# Patient Record
Sex: MALE | Race: WHITE | NOT HISPANIC OR LATINO | Employment: OTHER | URBAN - METROPOLITAN AREA
[De-identification: names, ages, dates, MRNs, and addresses within clinical notes are randomized per-mention and may not be internally consistent; named-entity substitution may affect disease eponyms.]

---

## 2024-09-28 ENCOUNTER — APPOINTMENT (OUTPATIENT)
Dept: RADIOLOGY | Facility: MEDICAL CENTER | Age: 49
DRG: 310 | End: 2024-09-28
Attending: EMERGENCY MEDICINE
Payer: COMMERCIAL

## 2024-09-28 ENCOUNTER — OFFICE VISIT (OUTPATIENT)
Dept: URGENT CARE | Facility: PHYSICIAN GROUP | Age: 49
End: 2024-09-28
Payer: COMMERCIAL

## 2024-09-28 ENCOUNTER — HOSPITAL ENCOUNTER (INPATIENT)
Facility: MEDICAL CENTER | Age: 49
LOS: 2 days | End: 2024-09-30
Attending: EMERGENCY MEDICINE | Admitting: INTERNAL MEDICINE
Payer: COMMERCIAL

## 2024-09-28 VITALS
WEIGHT: 304.1 LBS | BODY MASS INDEX: 37.81 KG/M2 | DIASTOLIC BLOOD PRESSURE: 94 MMHG | HEIGHT: 75 IN | TEMPERATURE: 97.4 F | HEART RATE: 98 BPM | SYSTOLIC BLOOD PRESSURE: 140 MMHG | RESPIRATION RATE: 16 BRPM | OXYGEN SATURATION: 98 %

## 2024-09-28 DIAGNOSIS — R79.89 ELEVATED BRAIN NATRIURETIC PEPTIDE (BNP) LEVEL: ICD-10-CM

## 2024-09-28 DIAGNOSIS — I48.91 ATRIAL FIBRILLATION, UNSPECIFIED TYPE (HCC): ICD-10-CM

## 2024-09-28 DIAGNOSIS — I48.91 ATRIAL FIBRILLATION WITH RVR (HCC): ICD-10-CM

## 2024-09-28 DIAGNOSIS — R00.2 PALPITATIONS: ICD-10-CM

## 2024-09-28 DIAGNOSIS — I48.0 PAROXYSMAL ATRIAL FIBRILLATION (HCC): ICD-10-CM

## 2024-09-28 PROBLEM — E66.9 OBESITY: Status: ACTIVE | Noted: 2024-09-28

## 2024-09-28 PROBLEM — I10 PRIMARY HYPERTENSION: Status: ACTIVE | Noted: 2024-09-28

## 2024-09-28 LAB
ALBUMIN SERPL BCP-MCNC: 4.4 G/DL (ref 3.2–4.9)
ALBUMIN/GLOB SERPL: 1.8 G/DL
ALP SERPL-CCNC: 48 U/L (ref 30–99)
ALT SERPL-CCNC: 27 U/L (ref 2–50)
ANION GAP SERPL CALC-SCNC: 9 MMOL/L (ref 7–16)
AST SERPL-CCNC: 20 U/L (ref 12–45)
BASOPHILS # BLD AUTO: 0.5 % (ref 0–1.8)
BASOPHILS # BLD: 0.03 K/UL (ref 0–0.12)
BILIRUB SERPL-MCNC: 0.5 MG/DL (ref 0.1–1.5)
BUN SERPL-MCNC: 19 MG/DL (ref 8–22)
CALCIUM ALBUM COR SERPL-MCNC: 8.9 MG/DL (ref 8.5–10.5)
CALCIUM SERPL-MCNC: 9.2 MG/DL (ref 8.5–10.5)
CHLORIDE SERPL-SCNC: 107 MMOL/L (ref 96–112)
CO2 SERPL-SCNC: 22 MMOL/L (ref 20–33)
CREAT SERPL-MCNC: 1.11 MG/DL (ref 0.5–1.4)
EKG IMPRESSION: NORMAL
EOSINOPHIL # BLD AUTO: 0.08 K/UL (ref 0–0.51)
EOSINOPHIL NFR BLD: 1.4 % (ref 0–6.9)
ERYTHROCYTE [DISTWIDTH] IN BLOOD BY AUTOMATED COUNT: 42.5 FL (ref 35.9–50)
EST. AVERAGE GLUCOSE BLD GHB EST-MCNC: 111 MG/DL
GFR SERPLBLD CREATININE-BSD FMLA CKD-EPI: 80 ML/MIN/1.73 M 2
GLOBULIN SER CALC-MCNC: 2.5 G/DL (ref 1.9–3.5)
GLUCOSE SERPL-MCNC: 108 MG/DL (ref 65–99)
HBA1C MFR BLD: 5.5 % (ref 4–5.6)
HCT VFR BLD AUTO: 45.6 % (ref 42–52)
HGB BLD-MCNC: 15.5 G/DL (ref 14–18)
IMM GRANULOCYTES # BLD AUTO: 0.02 K/UL (ref 0–0.11)
IMM GRANULOCYTES NFR BLD AUTO: 0.3 % (ref 0–0.9)
LYMPHOCYTES # BLD AUTO: 2.18 K/UL (ref 1–4.8)
LYMPHOCYTES NFR BLD: 38.1 % (ref 22–41)
MCH RBC QN AUTO: 32.8 PG (ref 27–33)
MCHC RBC AUTO-ENTMCNC: 34 G/DL (ref 32.3–36.5)
MCV RBC AUTO: 96.6 FL (ref 81.4–97.8)
MONOCYTES # BLD AUTO: 0.55 K/UL (ref 0–0.85)
MONOCYTES NFR BLD AUTO: 9.6 % (ref 0–13.4)
NEUTROPHILS # BLD AUTO: 2.86 K/UL (ref 1.82–7.42)
NEUTROPHILS NFR BLD: 50.1 % (ref 44–72)
NRBC # BLD AUTO: 0 K/UL
NRBC BLD-RTO: 0 /100 WBC (ref 0–0.2)
NT-PROBNP SERPL IA-MCNC: 433 PG/ML (ref 0–125)
PLATELET # BLD AUTO: 187 K/UL (ref 164–446)
PMV BLD AUTO: 9.7 FL (ref 9–12.9)
POTASSIUM SERPL-SCNC: 4.2 MMOL/L (ref 3.6–5.5)
PROT SERPL-MCNC: 6.9 G/DL (ref 6–8.2)
RBC # BLD AUTO: 4.72 M/UL (ref 4.7–6.1)
SODIUM SERPL-SCNC: 138 MMOL/L (ref 135–145)
TROPONIN T SERPL-MCNC: <6 NG/L (ref 6–19)
TROPONIN T SERPL-MCNC: <6 NG/L (ref 6–19)
TSH SERPL DL<=0.005 MIU/L-ACNC: 1.32 UIU/ML (ref 0.38–5.33)
WBC # BLD AUTO: 5.7 K/UL (ref 4.8–10.8)

## 2024-09-28 PROCEDURE — 99222 1ST HOSP IP/OBS MODERATE 55: CPT | Performed by: INTERNAL MEDICINE

## 2024-09-28 PROCEDURE — 80053 COMPREHEN METABOLIC PANEL: CPT

## 2024-09-28 PROCEDURE — 96374 THER/PROPH/DIAG INJ IV PUSH: CPT

## 2024-09-28 PROCEDURE — 700102 HCHG RX REV CODE 250 W/ 637 OVERRIDE(OP): Performed by: INTERNAL MEDICINE

## 2024-09-28 PROCEDURE — 84484 ASSAY OF TROPONIN QUANT: CPT

## 2024-09-28 PROCEDURE — 99285 EMERGENCY DEPT VISIT HI MDM: CPT

## 2024-09-28 PROCEDURE — 93005 ELECTROCARDIOGRAM TRACING: CPT | Performed by: EMERGENCY MEDICINE

## 2024-09-28 PROCEDURE — RXMED WILLOW AMBULATORY MEDICATION CHARGE: Performed by: EMERGENCY MEDICINE

## 2024-09-28 PROCEDURE — 93005 ELECTROCARDIOGRAM TRACING: CPT

## 2024-09-28 PROCEDURE — A9270 NON-COVERED ITEM OR SERVICE: HCPCS | Performed by: INTERNAL MEDICINE

## 2024-09-28 PROCEDURE — 770020 HCHG ROOM/CARE - TELE (206)

## 2024-09-28 PROCEDURE — 83036 HEMOGLOBIN GLYCOSYLATED A1C: CPT

## 2024-09-28 PROCEDURE — 99223 1ST HOSP IP/OBS HIGH 75: CPT | Performed by: INTERNAL MEDICINE

## 2024-09-28 PROCEDURE — A9270 NON-COVERED ITEM OR SERVICE: HCPCS | Performed by: EMERGENCY MEDICINE

## 2024-09-28 PROCEDURE — 71045 X-RAY EXAM CHEST 1 VIEW: CPT

## 2024-09-28 PROCEDURE — 84443 ASSAY THYROID STIM HORMONE: CPT

## 2024-09-28 PROCEDURE — 36415 COLL VENOUS BLD VENIPUNCTURE: CPT

## 2024-09-28 PROCEDURE — 85025 COMPLETE CBC W/AUTO DIFF WBC: CPT

## 2024-09-28 PROCEDURE — 83880 ASSAY OF NATRIURETIC PEPTIDE: CPT

## 2024-09-28 PROCEDURE — 700102 HCHG RX REV CODE 250 W/ 637 OVERRIDE(OP): Performed by: EMERGENCY MEDICINE

## 2024-09-28 PROCEDURE — 700111 HCHG RX REV CODE 636 W/ 250 OVERRIDE (IP): Performed by: EMERGENCY MEDICINE

## 2024-09-28 RX ORDER — AMLODIPINE BESYLATE 5 MG/1
1 TABLET ORAL DAILY
Status: ON HOLD | COMMUNITY
Start: 2023-11-01 | End: 2024-09-30

## 2024-09-28 RX ORDER — METOPROLOL SUCCINATE 50 MG/1
50 TABLET, EXTENDED RELEASE ORAL ONCE
Status: COMPLETED | OUTPATIENT
Start: 2024-09-28 | End: 2024-09-28

## 2024-09-28 RX ORDER — METOPROLOL TARTRATE 1 MG/ML
5 INJECTION, SOLUTION INTRAVENOUS
Status: DISCONTINUED | OUTPATIENT
Start: 2024-09-28 | End: 2024-09-30 | Stop reason: HOSPADM

## 2024-09-28 RX ORDER — LABETALOL HYDROCHLORIDE 5 MG/ML
20 INJECTION, SOLUTION INTRAVENOUS ONCE
Status: COMPLETED | OUTPATIENT
Start: 2024-09-28 | End: 2024-09-28

## 2024-09-28 RX ORDER — SILDENAFIL CITRATE 20 MG/1
20 TABLET ORAL
COMMUNITY

## 2024-09-28 RX ORDER — METOPROLOL TARTRATE 50 MG
50 TABLET ORAL 2 TIMES DAILY
Status: DISCONTINUED | OUTPATIENT
Start: 2024-09-28 | End: 2024-09-30 | Stop reason: HOSPADM

## 2024-09-28 RX ORDER — METOPROLOL SUCCINATE 25 MG/1
25 TABLET, EXTENDED RELEASE ORAL
Status: DISCONTINUED | OUTPATIENT
Start: 2024-09-28 | End: 2024-09-28

## 2024-09-28 RX ORDER — ACETAMINOPHEN 325 MG/1
650 TABLET ORAL EVERY 6 HOURS PRN
Status: DISCONTINUED | OUTPATIENT
Start: 2024-09-28 | End: 2024-09-30 | Stop reason: HOSPADM

## 2024-09-28 RX ORDER — METOPROLOL SUCCINATE 50 MG/1
50 TABLET, EXTENDED RELEASE ORAL
Status: DISCONTINUED | OUTPATIENT
Start: 2024-09-28 | End: 2024-09-28

## 2024-09-28 RX ORDER — LISINOPRIL AND HYDROCHLOROTHIAZIDE 20; 25 MG/1; MG/1
1 TABLET ORAL DAILY
COMMUNITY
Start: 2023-11-01 | End: 2024-09-28

## 2024-09-28 RX ORDER — LISINOPRIL 20 MG/1
20 TABLET ORAL DAILY
COMMUNITY

## 2024-09-28 RX ADMIN — LABETALOL HYDROCHLORIDE 20 MG: 5 INJECTION INTRAVENOUS at 12:54

## 2024-09-28 RX ADMIN — RIVAROXABAN 20 MG: 20 TABLET, FILM COATED ORAL at 17:34

## 2024-09-28 RX ADMIN — METOPROLOL SUCCINATE 50 MG: 50 TABLET, EXTENDED RELEASE ORAL at 13:24

## 2024-09-28 RX ADMIN — METOPROLOL TARTRATE 50 MG: 50 TABLET, FILM COATED ORAL at 18:35

## 2024-09-28 SDOH — ECONOMIC STABILITY: TRANSPORTATION INSECURITY
IN THE PAST 12 MONTHS, HAS THE LACK OF TRANSPORTATION KEPT YOU FROM MEDICAL APPOINTMENTS OR FROM GETTING MEDICATIONS?: NO

## 2024-09-28 SDOH — ECONOMIC STABILITY: TRANSPORTATION INSECURITY
IN THE PAST 12 MONTHS, HAS LACK OF RELIABLE TRANSPORTATION KEPT YOU FROM MEDICAL APPOINTMENTS, MEETINGS, WORK OR FROM GETTING THINGS NEEDED FOR DAILY LIVING?: NO

## 2024-09-28 ASSESSMENT — ENCOUNTER SYMPTOMS
ORTHOPNEA: 0
COUGH: 0
VOMITING: 0
DIARRHEA: 0
PALPITATIONS: 1
BLURRED VISION: 0
FEVER: 0
MYALGIAS: 0
NAUSEA: 0
CLAUDICATION: 0
CONSTIPATION: 0
PALPITATIONS: 1
WEIGHT LOSS: 0
NAUSEA: 0
ABDOMINAL PAIN: 0
CHILLS: 0
SHORTNESS OF BREATH: 1
SORE THROAT: 0
FEVER: 0
VOMITING: 0
HEMOPTYSIS: 0
MYALGIAS: 0
WEAKNESS: 0
NECK PAIN: 0
CHEST PRESSURE: 0
DIZZINESS: 0
SPEECH CHANGE: 0
DIZZINESS: 0
DOUBLE VISION: 0
CHILLS: 0
PHOTOPHOBIA: 0
COUGH: 0

## 2024-09-28 ASSESSMENT — PATIENT HEALTH QUESTIONNAIRE - PHQ9
1. LITTLE INTEREST OR PLEASURE IN DOING THINGS: NOT AT ALL
SUM OF ALL RESPONSES TO PHQ9 QUESTIONS 1 AND 2: 0
2. FEELING DOWN, DEPRESSED, IRRITABLE, OR HOPELESS: NOT AT ALL
2. FEELING DOWN, DEPRESSED, IRRITABLE, OR HOPELESS: NOT AT ALL
1. LITTLE INTEREST OR PLEASURE IN DOING THINGS: NOT AT ALL
SUM OF ALL RESPONSES TO PHQ9 QUESTIONS 1 AND 2: 0

## 2024-09-28 ASSESSMENT — COGNITIVE AND FUNCTIONAL STATUS - GENERAL
MOBILITY SCORE: 24
SUGGESTED CMS G CODE MODIFIER DAILY ACTIVITY: CH
SUGGESTED CMS G CODE MODIFIER MOBILITY: CH
DAILY ACTIVITIY SCORE: 24

## 2024-09-28 ASSESSMENT — LIFESTYLE VARIABLES
DOES PATIENT WANT TO STOP DRINKING: NO
ALCOHOL_USE: YES
TOTAL SCORE: 0
ON A TYPICAL DAY WHEN YOU DRINK ALCOHOL HOW MANY DRINKS DO YOU HAVE: 3
TOTAL SCORE: 0
HOW MANY TIMES IN THE PAST YEAR HAVE YOU HAD 5 OR MORE DRINKS IN A DAY: 0
HAVE PEOPLE ANNOYED YOU BY CRITICIZING YOUR DRINKING: NO
EVER HAD A DRINK FIRST THING IN THE MORNING TO STEADY YOUR NERVES TO GET RID OF A HANGOVER: NO
EVER FELT BAD OR GUILTY ABOUT YOUR DRINKING: NO
HAVE YOU EVER FELT YOU SHOULD CUT DOWN ON YOUR DRINKING: NO
CONSUMPTION TOTAL: NEGATIVE
AVERAGE NUMBER OF DAYS PER WEEK YOU HAVE A DRINK CONTAINING ALCOHOL: 3
TOTAL SCORE: 0

## 2024-09-28 ASSESSMENT — SOCIAL DETERMINANTS OF HEALTH (SDOH)
IN THE PAST 12 MONTHS, HAS THE ELECTRIC, GAS, OIL, OR WATER COMPANY THREATENED TO SHUT OFF SERVICE IN YOUR HOME?: NO
WITHIN THE LAST YEAR, HAVE YOU BEEN HUMILIATED OR EMOTIONALLY ABUSED IN OTHER WAYS BY YOUR PARTNER OR EX-PARTNER?: NO
WITHIN THE LAST YEAR, HAVE TO BEEN RAPED OR FORCED TO HAVE ANY KIND OF SEXUAL ACTIVITY BY YOUR PARTNER OR EX-PARTNER?: NO
WITHIN THE LAST YEAR, HAVE YOU BEEN AFRAID OF YOUR PARTNER OR EX-PARTNER?: NO
WITHIN THE PAST 12 MONTHS, YOU WORRIED THAT YOUR FOOD WOULD RUN OUT BEFORE YOU GOT THE MONEY TO BUY MORE: NEVER TRUE
WITHIN THE PAST 12 MONTHS, THE FOOD YOU BOUGHT JUST DIDN'T LAST AND YOU DIDN'T HAVE MONEY TO GET MORE: NEVER TRUE
WITHIN THE LAST YEAR, HAVE YOU BEEN KICKED, HIT, SLAPPED, OR OTHERWISE PHYSICALLY HURT BY YOUR PARTNER OR EX-PARTNER?: NO

## 2024-09-28 ASSESSMENT — FIBROSIS 4 INDEX: FIB4 SCORE: 1.049727762162955935

## 2024-09-28 NOTE — ASSESSMENT & PLAN NOTE
9/29/2024  Will hold his medication lisinopril and amlodipine and start with metoprolol for A-fib  Close monitoring and adjust medication if needed

## 2024-09-28 NOTE — ASSESSMENT & PLAN NOTE
Body mass index is 39.59 kg/m².  Counseled about diet and exercise  Check lipid panel and A1c  Encouraged the patient to follow-up with PCP and repeat sleep study

## 2024-09-28 NOTE — ED NOTES
Medication Reconciliation    Medication reconciliation is complete per patient reporting.  - Allergies reviewed.  - No outpatient antibiotics in the last 30 days.  - No anticoagulants in the last 14 days.    Ayanna Hairston, Pharmacy Intern

## 2024-09-28 NOTE — ED NOTES
Chief Complaint   Patient presents with    Palpitations     Noted to have new onset A-fib. Symptoms x several days. PIV established, blood drawn and sent to lab.

## 2024-09-28 NOTE — ED PROVIDER NOTES
ED Provider Note    CHIEF COMPLAINT  Chief Complaint   Patient presents with    Palpitations       EXTERNAL RECORDS REVIEWED  Outpatient Notes reviewed [place] urgent care note for atrial fibrillation as the patient was seen today and sent in for definitive care to the ER    HPI/SHARI  LIMITATION TO HISTORY   Select: : None  OUTSIDE HISTORIAN(S):  Significant other patient's spouse is at the bedside given history about how he looked including his pallor and pasty appearance intermittently over the last couple of days    Km Sotelo is a 49 y.o. male who presents stating that he has had some significant palpitation for the last 3 to 4 days and his wife says that he is looked pale and pasty at times.  He reports shortness of breath with these episodes.  Patient denies any chest pain but says that he sometimes feels dizzy and lightheaded and feels like his heart is racing.  Denies any leg pain or swelling or prior history of atrial fibrillation.  Had a stress test many years ago that was unremarkable on a treadmill.  Is compliant with his antihypertensive therapy and recent lipid panel was unremarkable and he is not on a statin.  Patient was sent in from urgent care for evaluation    PAST MEDICAL HISTORY   has a past medical history of Hypertension.    SURGICAL HISTORY   has a past surgical history that includes meniscus repair.    FAMILY HISTORY  History reviewed. No pertinent family history.    SOCIAL HISTORY  Social History     Tobacco Use    Smoking status: Never    Smokeless tobacco: Current   Vaping Use    Vaping status: Never Used   Substance and Sexual Activity    Alcohol use: Yes     Alcohol/week: 8.4 oz     Types: 14 Cans of beer per week    Drug use: Never    Sexual activity: Not on file       CURRENT MEDICATIONS  Home Medications    **Home medications have not yet been reviewed for this encounter**       Audit from Redirected Encounters    **Home medications have not yet been reviewed for this  "encounter**         ALLERGIES  No Known Allergies    PHYSICAL EXAM  VITAL SIGNS: /87   Pulse 90   Temp 36.4 °C (97.6 °F) (Temporal)   Resp 18   Ht 1.854 m (6' 1\")   Wt (!) 138 kg (304 lb 0.2 oz)   SpO2 98%   BMI 40.11 kg/m²    Constitutional: Well developed, Well nourished, No acute distress, Non-toxic appearance.   HENT: Normocephalic, Atraumatic, Bilateral external ears normal, Oropharynx is clear mucous membranes are moist. No oral exudates or nasal discharge.   Eyes: Pupils are equal round and reactive, EOMI, Conjunctiva normal, No discharge.   Neck: Normal range of motion, No tenderness, Supple, No stridor. No meningismus.  Lymphatic: No lymphadenopathy noted.   Cardiovascular: Cardiac rate and irregular rhythm without murmur rub or gallop.  Thorax & Lungs: Clear breath sounds bilaterally without wheezes, rhonchi or rales. There is no chest wall tenderness.   Abdomen: Soft non-tender non-distended. There is no rebound or guarding. No organomegaly is appreciated. Bowel sounds are normal.  Skin: Normal without rash.   Back: No CVA or spinal tenderness.   Extremities: Intact distal pulses, No edema, No tenderness, No cyanosis, No clubbing. Capillary refill is less than 2 seconds.  Musculoskeletal: Good range of motion in all major joints. No tenderness to palpation or major deformities noted.   Neurologic: Alert & oriented x 3, Normal motor function, Normal sensory function, No focal deficits noted. Reflexes are normal.  Psychiatric: Affect normal, Judgment normal, Mood normal. There is no suicidal ideation or patient reported hallucinations.         EKG/LABS  Results for orders placed or performed during the hospital encounter of 09/28/24   EKG (NOW)   Result Value Ref Range    Report       [facility] Emergency Dept.    Test Date:  2024-09-28  Pt Name:    ISABEL STARKS               Department: ER  MRN:        [id number]                      Room:  Gender:     Male                   "       Technician: [id number]  :        1975                   Requested By:ER TRIAGE PROTOCOL  Order #:    [id number]                    Reading MD: YUNG ROWE MD    Measurements  Intervals                                Axis  Rate:       112                          P:          0  TN:         0                            QRS:        27  QRSD:       118                          T:          16  QT:         355  QTc:        485    Interpretive Statements  Atrial fibrillation  Ventricular premature complex  Nonspecific intraventricular conduction delay  No previous ECG available for comparison  Electronically Signed On 2024 12:34:56 PDT by YUNG ROWE MD         I have independently interpreted this EKG    RADIOLOGY/PROCEDURES   I have independently interpreted the diagnostic imaging associated with this visit and am waiting the final reading from the radiologist.   My preliminary interpretation is as follows: No pulmonary edema    Radiologist interpretation:  DX-CHEST-PORTABLE (1 VIEW)   Final Result         No acute cardiac or pulmonary abnormality is identified.          COURSE & MEDICAL DECISION MAKING    ASSESSMENT, COURSE AND PLAN  Care Narrative: Patient presents with symptomatic atrial fibrillation that is new onset.  He has had it for at least 3 to 4 days by the history I obtained.    Patient's heart rate and blood pressure were both elevated and I given 20 mg of labetalol and this seemed to calm his vital signs down quite nicely.    I worked up the patient and his white blood cell count was unremarkable with no shift, no electrolyte derangements or acidosis.  Troponin is unremarkable but BNP is elevated at 433.  Delta troponin strategy yielded a second troponin that is normal as well.    Clinically is not in pulmonary edema and I will withhold diuretics.  Chest x-ray does not show any abnormalities otherwise.    I spoke with cardiology at approximately 12:45 PM.  The patient was given options  including admission for UNA, echo and ultimately cardioversion which is what he would like to do.  I think this is reasonable and I have anticoagulated him with Xarelto and given him first dose metoprolol.    DISPOSITION AND DISCUSSIONS  I have discussed management of the patient with the following physicians and KAREN's: Spoke with cardiology on-call at approximately 12:45 PM.  This was Dr. Arevalo.  I spoke with the patient and he would like to stay in hospital and have UNA, echo, cardioversion    Patient is admitted to the hospital after speaking with the hospitalist at approximately 1:15 PM the patient is updated on plan of care and agreeable to admission    FINAL DIAGNOSIS  1. Paroxysmal atrial fibrillation (HCC)    2. Elevated brain natriuretic peptide (BNP) level         Electronically signed by: Ihsan Oliveira M.D., 9/28/2024 12:35 PM

## 2024-09-28 NOTE — PROGRESS NOTES
Subjective:   Km Sotelo is a 49 y.o. male who presents for Irregular Heart Beat (Been dropping in the 50's and going up to 140's, heart palpitations x 1 day )        49-year-old with a history of hypertension presents urgent care with chief complaint of palpitations over the past 2 days.  Reports intermittent palpitations over the past 2 days, reports onset while at rest on the couch.  Denies chest pain, reports intermittent dyspnea with palpitations and experiencing a fast heart rate.    Palpitations   This is a new (Reports palpitations and fast heart rate over the past few days) problem. Episode onset: 2 days. The problem occurs intermittently. The problem has been waxing and waning. Nothing aggravates the symptoms. Associated symptoms include shortness of breath (Intermittently). Pertinent negatives include no chest fullness, chest pain, coughing, dizziness, fever, nausea or vomiting. Risk factors: Denies history of previous atrial fibrillation.     PMH:  has no past medical history on file.  MEDS:   Current Outpatient Medications:     amLODIPine (NORVASC) 5 MG Tab, Take 1 Tablet by mouth every day., Disp: , Rfl:     lisinopril-hydrochlorothiazide (PRINZIDE) 20-25 MG per tablet, Take 1 Tablet by mouth every day., Disp: , Rfl:   ALLERGIES: No Known Allergies  SURGHX: History reviewed. No pertinent surgical history.  SOCHX:  reports that he has never smoked. He uses smokeless tobacco.  FH: History reviewed. No pertinent family history.  Review of Systems   Constitutional:  Negative for chills and fever.   HENT:  Negative for sore throat.    Respiratory:  Positive for shortness of breath (Intermittently). Negative for cough.    Cardiovascular:  Positive for palpitations. Negative for chest pain and leg swelling.   Gastrointestinal:  Negative for nausea and vomiting.   Musculoskeletal:  Negative for myalgias.   Skin:  Negative for rash.   Neurological:  Negative for dizziness.        Objective:   BP (!)  "140/94   Pulse 98   Temp 36.3 °C (97.4 °F) (Temporal)   Resp 16   Ht 1.905 m (6' 3\")   Wt (!) 138 kg (304 lb 1.6 oz)   SpO2 98%   BMI 38.01 kg/m²   Physical Exam  Vitals and nursing note reviewed.   Constitutional:       General: He is not in acute distress.     Appearance: He is well-developed.   HENT:      Head: Normocephalic and atraumatic.      Right Ear: External ear normal.      Left Ear: External ear normal.      Nose: Nose normal.      Mouth/Throat:      Mouth: Mucous membranes are moist.   Eyes:      Conjunctiva/sclera: Conjunctivae normal.   Cardiovascular:      Rate and Rhythm: Tachycardia present. Rhythm irregular.      Heart sounds: No murmur heard.  Pulmonary:      Effort: Pulmonary effort is normal. No respiratory distress.      Breath sounds: Normal breath sounds. No wheezing or rhonchi.   Abdominal:      General: There is no distension.   Musculoskeletal:         General: Normal range of motion.   Skin:     General: Skin is warm and dry.   Neurological:      General: No focal deficit present.      Mental Status: He is alert and oriented to person, place, and time. Mental status is at baseline.      Gait: Gait (gait at baseline) normal.   Psychiatric:         Judgment: Judgment normal.     EKG: Atrial fibrillation at 105, no ST segment elevation, no ST segment depression, no T wave inversion      Assessment/Plan:   1. Atrial fibrillation, unspecified type (HCC)  - EKG - Clinic Performed    2. Palpitations  - EKG - Clinic Performed    Other orders  - amLODIPine (NORVASC) 5 MG Tab; Take 1 Tablet by mouth every day.  - lisinopril-hydrochlorothiazide (PRINZIDE) 20-25 MG per tablet; Take 1 Tablet by mouth every day.        Medical Decision Making/Course:  In the context of clinical presentation of palpitations with physical exam and EKG findings consistent with new onset atrial fibrillation with intermittent dyspnea, emergency department evaluation or management is warranted for further evaluation " and management and consideration of cardiology consultation and/or inpatient management.  The [facility] emergency department was advised and the patient requested transport by private vehicle and the transfer center was notified.  In the course of preparing for this visit with review of the pertinent past medical history, recent and past clinic visits, current medications, and performing chart, immunization, medical history and medication reconciliation, and in the further course of obtaining the current history pertinent to the clinic visit today, performing an exam and evaluation, ordering and independently evaluating labs, tests including EKG testing, and/or procedures, prescribing any recommended new medications as noted above, providing any pertinent counseling and education and recommending further coordination of care including contact coordination with transfer center, at least  44 minutes of total time were spent during this encounter.        Please note that this dictation was created using voice recognition software. I have worked with consultants from the vendor as well as technical experts from [facility] to optimize the interface. I have made every reasonable attempt to correct obvious errors, but I expect that there are errors of grammar and possibly content that I did not discover before finalizing the note.

## 2024-09-28 NOTE — H&P
Hospital Medicine History & Physical Note    Date of Service  9/28/2024    Primary Care Physician  Pcp Pt States None    Consultants  Cardiologist    Code Status  Full Code    Chief Complaint  Chief Complaint   Patient presents with    Palpitations       History of Presenting Illness    49-year-old male with history of hypertension, obesity and mild sleep apnea who presented 9/28 with palpitation.  Patient has had palpitation for last couple days, getting worse, denies significant chest pain, no dizziness or lightheadedness and no syncope.  Patient does not have history of A-fib or significant medical problems before.  On admission patient was found to have A-fib with RVR.  Patient received metoprolol with improving.  Cardiologist was consulted for possible cardioversion, Xarelto and metoprolol 50 mg daily was started.  Patient denied any history of alcoholism or drug abuse and no significant history of smoking.    I discussed the plan of care with patient, family, bedside RN, and ED physician .    Review of Systems  Review of Systems   Constitutional:  Negative for chills, fever and weight loss.   HENT:  Negative for ear pain, hearing loss and tinnitus.    Eyes:  Negative for blurred vision, double vision and photophobia.   Respiratory:  Negative for cough and hemoptysis.    Cardiovascular:  Positive for palpitations. Negative for chest pain, orthopnea and claudication.   Gastrointestinal:  Negative for abdominal pain, constipation, diarrhea, nausea and vomiting.   Genitourinary:  Negative for dysuria, frequency and urgency.   Musculoskeletal:  Negative for myalgias and neck pain.   Skin:  Negative for rash.   Neurological:  Negative for dizziness, speech change and weakness.       Past Medical History   has a past medical history of Hypertension.    Surgical History   has a past surgical history that includes meniscus repair.     Family History  Coronary artery disease with family/dad  Family history reviewed with  patient. There is no family history that is pertinent to the chief complaint.     Social History   reports that he has never smoked. He uses smokeless tobacco. He reports current alcohol use of about 8.4 oz of alcohol per week. He reports that he does not use drugs.    Allergies  No Known Allergies    Medications  Prior to Admission Medications   Prescriptions Last Dose Informant Patient Reported? Taking?   amLODIPine (NORVASC) 5 MG Tab   Yes No   Sig: Take 1 Tablet by mouth every day.   lisinopril-hydrochlorothiazide (PRINZIDE) 20-25 MG per tablet   Yes No   Sig: Take 1 Tablet by mouth every day.      Facility-Administered Medications: None       Physical Exam  Temp:  [36.3 °C (97.4 °F)-36.4 °C (97.6 °F)] 36.4 °C (97.6 °F)  Pulse:  [] 112  Resp:  [14-19] 16  BP: (130-172)/() 160/83  SpO2:  [92 %-98 %] 95 %  Blood Pressure: 138/88   Temperature: 36.4 °C (97.6 °F)   Pulse: 91   Respiration: 16   Pulse Oximetry: 92 %       Physical Exam  Constitutional:       General: He is not in acute distress.     Appearance: He is obese. He is not ill-appearing.   Eyes:      General: No scleral icterus.  Cardiovascular:      Rate and Rhythm: Normal rate. Rhythm irregular.      Heart sounds: No murmur heard.  Pulmonary:      Effort: No respiratory distress.      Breath sounds: No wheezing.   Abdominal:      General: There is no distension.      Tenderness: There is no abdominal tenderness. There is no right CVA tenderness, left CVA tenderness or guarding.   Musculoskeletal:      Right lower leg: No edema.      Left lower leg: No edema.   Lymphadenopathy:      Cervical: No cervical adenopathy.   Skin:     Coloration: Skin is not jaundiced.      Findings: No bruising, lesion or rash.   Neurological:      General: No focal deficit present.      Mental Status: He is alert and oriented to person, place, and time. Mental status is at baseline.      Cranial Nerves: No cranial nerve deficit.      Motor: No weakness.      Gait:  Gait normal.         Laboratory:  Recent Labs     09/28/24  1112   WBC 5.7   RBC 4.72   HEMOGLOBIN 15.5   HEMATOCRIT 45.6   MCV 96.6   MCH 32.8   MCHC 34.0   RDW 42.5   PLATELETCT 187   MPV 9.7     Recent Labs     09/28/24  1112   SODIUM 138   POTASSIUM 4.2   CHLORIDE 107   CO2 22   GLUCOSE 108*   BUN 19   CREATININE 1.11   CALCIUM 9.2     Recent Labs     09/28/24  1112   ALTSGPT 27   ASTSGOT 20   ALKPHOSPHAT 48   TBILIRUBIN 0.5   GLUCOSE 108*         Recent Labs     09/28/24  1112   NTPROBNP 433*         Recent Labs     09/28/24  1112 09/28/24  1230   TROPONINT <6 <6       Imaging:  DX-CHEST-PORTABLE (1 VIEW)   Final Result         No acute cardiac or pulmonary abnormality is identified.      EC-ECHOCARDIOGRAM COMPLETE W/O CONT    (Results Pending)       X-Ray:  I have personally reviewed the images and compared with prior images.  EKG:  I have personally reviewed the images and compared with prior images.    Assessment/Plan:  Justification for Admission Status  I anticipate this patient will require at least two midnights for appropriate medical management, necessitating inpatient admission because A-fib with RVR    Patient will need a Telemetry bed on CARDIOLOGY service .  The need is secondary to A-fib with RVR.    * Atrial fibrillation with RVR (HCC)- (present on admission)  Assessment & Plan  New diagnosis, came with palpitation  Start with metoprolol XL 50 mg daily  Xarelto 20 mg daily, waiting for echo and check CHADS2 score  Check TSH  No history of drinking alcohol, patient might need repeat sleep study  Cardioversion and UNA during hospitalization versus in clinic in 3 weeks, follow-up with cardiology team.    Obesity  Assessment & Plan  Encouraged the patient to lose weight  Check lipid panel and A1c  Encouraged the patient to follow-up with PCP and repeat sleep study    Primary hypertension  Assessment & Plan  Will hold his medication lisinopril and amlodipine and start with metoprolol for A-fib  Close  monitoring and adjust medication if needed        VTE prophylaxis: SCDs/TEDs and therapeutic anticoagulation with Xarelto

## 2024-09-28 NOTE — ASSESSMENT & PLAN NOTE
9/29/2024  New diagnosis, came with palpitation  Start with metoprolol XL 50 mg daily  Xarelto 20 mg daily, waiting for echo and check CHADS2 score  Check TSH  No history of drinking alcohol, patient might need repeat sleep study  Cardioversion and UNA during hospitalization versus in clinic in 3 weeks, follow-up with cardiology team.

## 2024-09-28 NOTE — ED TRIAGE NOTES
Palpitations x 3 days; sent by urgent care after abnormal EKG obtained showing afib, no history of same. Pt denies chest pain. +sob, slight.     .Patient returned to lobby, educated regarding triage process. Pt educated to inform staff of any worsening symptoms or need for additional assistance.

## 2024-09-28 NOTE — PROGRESS NOTES
Brief Cardiology Note:    I was called to discuss this patients care with Dr. Ihsan Oliveira. We discussed patient's case and presentation.    This is a 49-year-old man with past medical history significant for hypertension who presented to the emergency department with palpitations over the past 3 to 4 days.  He was found to have new onset atrial fibrillation on EKG.  Troponin was checked and was within normal limits.  No major electrolyte derangements.    Recommendations:  Given onset of palpitations 3-4 days ago, would need UNA guidance prior to cardioversion.  According to ERP, patient stable for discharge home.  Recommend rate control and anticoagulation.  Recommend starting metoprolol succinate 50 mg daily as well as Eliquis or Xarelto for OAC.  Patient will need to be seen in our office as an outpatient for arrangements of DCCV in 3 weeks versus UNA guided cardioversion.    At this time it was deemed no formal in person cardiology consultation was necessary, however if this changes due to changes in patient condition or abnormal test results, please re-consult cardiology.     Electronically Signed by:  Viraj Baldwin MD, FACC  9/28/2024  12:49 PM

## 2024-09-29 LAB
ANION GAP SERPL CALC-SCNC: 15 MMOL/L (ref 7–16)
BASOPHILS # BLD AUTO: 0.4 % (ref 0–1.8)
BASOPHILS # BLD: 0.03 K/UL (ref 0–0.12)
BUN SERPL-MCNC: 19 MG/DL (ref 8–22)
CALCIUM SERPL-MCNC: 8.8 MG/DL (ref 8.5–10.5)
CHLORIDE SERPL-SCNC: 105 MMOL/L (ref 96–112)
CHOLEST SERPL-MCNC: 161 MG/DL (ref 100–199)
CO2 SERPL-SCNC: 20 MMOL/L (ref 20–33)
CREAT SERPL-MCNC: 1.05 MG/DL (ref 0.5–1.4)
EKG IMPRESSION: NORMAL
EKG IMPRESSION: NORMAL
EOSINOPHIL # BLD AUTO: 0.14 K/UL (ref 0–0.51)
EOSINOPHIL NFR BLD: 2 % (ref 0–6.9)
ERYTHROCYTE [DISTWIDTH] IN BLOOD BY AUTOMATED COUNT: 43.3 FL (ref 35.9–50)
GFR SERPLBLD CREATININE-BSD FMLA CKD-EPI: 86 ML/MIN/1.73 M 2
GLUCOSE SERPL-MCNC: 103 MG/DL (ref 65–99)
HCT VFR BLD AUTO: 40.9 % (ref 42–52)
HDLC SERPL-MCNC: 30 MG/DL
HGB BLD-MCNC: 13.9 G/DL (ref 14–18)
IMM GRANULOCYTES # BLD AUTO: 0.02 K/UL (ref 0–0.11)
IMM GRANULOCYTES NFR BLD AUTO: 0.3 % (ref 0–0.9)
LDLC SERPL CALC-MCNC: 119 MG/DL
LYMPHOCYTES # BLD AUTO: 2.9 K/UL (ref 1–4.8)
LYMPHOCYTES NFR BLD: 40.5 % (ref 22–41)
MAGNESIUM SERPL-MCNC: 1.9 MG/DL (ref 1.5–2.5)
MCH RBC QN AUTO: 32.9 PG (ref 27–33)
MCHC RBC AUTO-ENTMCNC: 34 G/DL (ref 32.3–36.5)
MCV RBC AUTO: 96.7 FL (ref 81.4–97.8)
MONOCYTES # BLD AUTO: 0.63 K/UL (ref 0–0.85)
MONOCYTES NFR BLD AUTO: 8.8 % (ref 0–13.4)
NEUTROPHILS # BLD AUTO: 3.44 K/UL (ref 1.82–7.42)
NEUTROPHILS NFR BLD: 48 % (ref 44–72)
NRBC # BLD AUTO: 0 K/UL
NRBC BLD-RTO: 0 /100 WBC (ref 0–0.2)
PHOSPHATE SERPL-MCNC: 4.7 MG/DL (ref 2.5–4.5)
PLATELET # BLD AUTO: 166 K/UL (ref 164–446)
PMV BLD AUTO: 10 FL (ref 9–12.9)
POTASSIUM SERPL-SCNC: 4.1 MMOL/L (ref 3.6–5.5)
RBC # BLD AUTO: 4.23 M/UL (ref 4.7–6.1)
SODIUM SERPL-SCNC: 140 MMOL/L (ref 135–145)
TRIGL SERPL-MCNC: 59 MG/DL (ref 0–149)
WBC # BLD AUTO: 7.2 K/UL (ref 4.8–10.8)

## 2024-09-29 PROCEDURE — 99233 SBSQ HOSP IP/OBS HIGH 50: CPT | Performed by: INTERNAL MEDICINE

## 2024-09-29 PROCEDURE — A9270 NON-COVERED ITEM OR SERVICE: HCPCS | Performed by: INTERNAL MEDICINE

## 2024-09-29 PROCEDURE — 85025 COMPLETE CBC W/AUTO DIFF WBC: CPT

## 2024-09-29 PROCEDURE — 80061 LIPID PANEL: CPT

## 2024-09-29 PROCEDURE — 83735 ASSAY OF MAGNESIUM: CPT

## 2024-09-29 PROCEDURE — 93010 ELECTROCARDIOGRAM REPORT: CPT | Performed by: INTERNAL MEDICINE

## 2024-09-29 PROCEDURE — 80048 BASIC METABOLIC PNL TOTAL CA: CPT

## 2024-09-29 PROCEDURE — 84100 ASSAY OF PHOSPHORUS: CPT

## 2024-09-29 PROCEDURE — 93010 ELECTROCARDIOGRAM REPORT: CPT | Mod: 76 | Performed by: INTERNAL MEDICINE

## 2024-09-29 PROCEDURE — 36415 COLL VENOUS BLD VENIPUNCTURE: CPT

## 2024-09-29 PROCEDURE — 700102 HCHG RX REV CODE 250 W/ 637 OVERRIDE(OP): Performed by: INTERNAL MEDICINE

## 2024-09-29 PROCEDURE — 93005 ELECTROCARDIOGRAM TRACING: CPT | Performed by: INTERNAL MEDICINE

## 2024-09-29 PROCEDURE — 770020 HCHG ROOM/CARE - TELE (206)

## 2024-09-29 PROCEDURE — 99232 SBSQ HOSP IP/OBS MODERATE 35: CPT | Mod: FS | Performed by: INTERNAL MEDICINE

## 2024-09-29 RX ORDER — LANOLIN ALCOHOL/MO/W.PET/CERES
400 CREAM (GRAM) TOPICAL 2 TIMES DAILY
Status: COMPLETED | OUTPATIENT
Start: 2024-09-29 | End: 2024-09-29

## 2024-09-29 RX ORDER — LISINOPRIL 20 MG/1
20 TABLET ORAL
Status: DISCONTINUED | OUTPATIENT
Start: 2024-09-29 | End: 2024-09-30 | Stop reason: HOSPADM

## 2024-09-29 RX ADMIN — LISINOPRIL 20 MG: 20 TABLET ORAL at 18:15

## 2024-09-29 RX ADMIN — RIVAROXABAN 20 MG: 20 TABLET, FILM COATED ORAL at 17:38

## 2024-09-29 RX ADMIN — METOPROLOL TARTRATE 50 MG: 50 TABLET, FILM COATED ORAL at 17:38

## 2024-09-29 RX ADMIN — Medication 400 MG: at 06:24

## 2024-09-29 RX ADMIN — METOPROLOL TARTRATE 50 MG: 50 TABLET, FILM COATED ORAL at 04:24

## 2024-09-29 RX ADMIN — Medication 400 MG: at 17:38

## 2024-09-29 ASSESSMENT — ENCOUNTER SYMPTOMS
COUGH: 0
PALPITATIONS: 1
STRIDOR: 0
SHORTNESS OF BREATH: 1
SHORTNESS OF BREATH: 0
CHOKING: 0
CHEST TIGHTNESS: 0

## 2024-09-29 ASSESSMENT — PAIN DESCRIPTION - PAIN TYPE
TYPE: ACUTE PAIN
TYPE: ACUTE PAIN

## 2024-09-29 ASSESSMENT — CHA2DS2 SCORE
DIABETES: NO
PRIOR STROKE OR TIA OR THROMBOEMBOLISM: NO
SEX: MALE
CHF OR LEFT VENTRICULAR DYSFUNCTION: NO
AGE 65 TO 74: NO
AGE 75 OR GREATER: NO
CHA2DS2 VASC SCORE: 1
HYPERTENSION: YES
VASCULAR DISEASE: NO

## 2024-09-29 ASSESSMENT — FIBROSIS 4 INDEX: FIB4 SCORE: 1.049727762162955935

## 2024-09-29 NOTE — CONSULTS
Cardiology Initial Consult Note    Reason for Consult:  Asked by Dr Tab Ibanez M.D. to see this patient with new onset atrial fibrillation  Patient's PCP: Pcp Pt States None    CC:   Chief Complaint   Patient presents with    Palpitations       HPI:   This is a 49-year-old man with past medical history significant for hypertension, obesity, sleep apnea who has been experiencing palpitations over the past 3 to 4 days.  With these palpitations he has shortness of breath.  Denies having chest pain.    New symptoms, he presented to the ER for further evaluation.  EKG was checked in the ER which demonstrates atrial fibrillation with RVR.  Troponins were checked and were within normal limits.  There were no major electrolyte derangements on routine chemistry.    Despite receiving medications for rate control, patient continued to experience palpitations.  As such he was admitted to the medicine service for ongoing cardiac care.  Now being seen in cardiac consultation for consideration of cardioversion for restoration of sinus rhythm.    Currently, patient has noticed some improvement in palpitations.  Will still occasionally experience symptoms.  However denies having chest pain, lightheadedness/dizziness or episodes of syncope.  He has not experienced orthopnea or PND.  No lower extremity edema.  He denies having history of prior myocardial infarction.  No known history of heart failure or CVA.    Telemetry: A-fib with ventricular rate ranging from 90 to 120 bpm    Medications / Drug list prior to admission:  No current facility-administered medications on file prior to encounter.     Current Outpatient Medications on File Prior to Encounter   Medication Sig Dispense Refill    amLODIPine (NORVASC) 5 MG Tab Take 1 Tablet by mouth every day.      lisinopril (PRINIVIL) 20 MG Tab Take 20 mg by mouth every day.      sildenafil (REVATIO) 20 MG tablet Take 20 mg by mouth 1 time a day as needed (erectile dysfunction).    "      Current list of administered Medications:    Current Facility-Administered Medications:     rivaroxaban (Xarelto) tablet 20 mg, 20 mg, Oral, PM MEAL, Ihsan Oliveira M.D.    acetaminophen (Tylenol) tablet 650 mg, 650 mg, Oral, Q6HRS PRN, Tab Ibanez M.D.    Metoprolol Tartrate (Lopressor) injection 5 mg, 5 mg, Intravenous, Q5 MIN PRN, Tab Ibanez M.D.    metoprolol SR (Toprol XL) tablet 50 mg, 50 mg, Oral, Q DAY, Tab Ibanez M.D.    Past Medical History:   Diagnosis Date    Hypertension        Past Surgical History:   Procedure Laterality Date    MENISCUS REPAIR         History reviewed. No pertinent family history.  Patient family history was personally reviewed, no pertinent family history to current presentation    Social History     Tobacco Use    Smoking status: Never    Smokeless tobacco: Current   Vaping Use    Vaping status: Never Used   Substance Use Topics    Alcohol use: Yes     Alcohol/week: 8.4 oz     Types: 14 Cans of beer per week    Drug use: Never       ALLERGIES:  No Known Allergies    Review of systems:  A complete review of symptoms was reviewed with the patient. This is reviewed in H&P and PMH. ALL OTHERS reviewed and negative    Physical exam:  Patient Vitals for the past 24 hrs:   BP Temp Temp src Pulse Resp SpO2 Height Weight   09/28/24 1529 -- -- -- -- -- -- -- (!) 138 kg (303 lb 12.7 oz)   09/28/24 1525 120/69 36.9 °C (98.4 °F) Temporal 95 16 97 % -- --   09/28/24 1448 (!) 153/98 -- -- 85 15 96 % -- --   09/28/24 1425 (!) 138/93 -- -- (!) 101 -- 94 % -- --   09/28/24 1349 (!) 160/83 -- -- (!) 112 -- 95 % -- --   09/28/24 1318 138/88 -- -- 91 16 92 % -- --   09/28/24 1303 (!) 142/112 -- -- 90 14 95 % -- --   09/28/24 1233 (!) 167/107 -- -- 86 -- 97 % -- --   09/28/24 1227 (!) 172/116 -- -- (!) 114 19 96 % -- --   09/28/24 1058 -- -- -- -- -- -- 1.854 m (6' 1\") (!) 138 kg (304 lb 0.2 oz)   09/28/24 1046 130/87 36.4 °C (97.6 °F) Temporal 90 18 98 % -- --     General: " Not in acute distress  EYES: No jaundice  HEENT: OP clear   Neck:  No carotid bruits, No JVD appreciated  CVS: Irregularly irregular, Normal S1, S2. No murmurs, rubs or gallops  Resp: Normal respiratory effort, lungs CTA bilaterally. No rales or rhonchi  Abdomen: Soft, non-distended, non-tender to palpation  Skin: No obvious rashes, no cyanosis  Neurological: Alert and oriented x3, moves all extremities, no focal neurologic deficits  Psychiatric: Appropriate affect  Extremities:   Extremities warm, no edema, pulses intact      Data:  Laboratory studies personally reviewed by me:  Recent Results (from the past 24 hour(s))   EKG (NOW)    Collection Time: 24 10:53 AM   Result Value Ref Range    Report       [facility] Emergency Dept.    Test Date:  2024  Pt Name:    ISABEL STARKS               Department: ER  MRN:        [id number]                      Room:  Gender:     Male                         Technician: [id number]  :        1975                   Requested By:ER TRIAGE PROTOCOL  Order #:    [id number]                    Reading MD: YUNG ROWE MD    Measurements  Intervals                                Axis  Rate:       112                          P:          0  ID:         0                            QRS:        27  QRSD:       118                          T:          16  QT:         355  QTc:        485    Interpretive Statements  Atrial fibrillation  Ventricular premature complex  Nonspecific intraventricular conduction delay  No previous ECG available for comparison  Electronically Signed On 2024 12:34:56 PDT by YUNG ROWE MD     CBC with Differential    Collection Time: 24 11:12 AM   Result Value Ref Range    WBC 5.7 4.8 - 10.8 K/uL    RBC 4.72 4.70 - 6.10 M/uL    Hemoglobin 15.5 14.0 - 18.0 g/dL    Hematocrit 45.6 42.0 - 52.0 %    MCV 96.6 81.4 - 97.8 fL    MCH 32.8 27.0 - 33.0 pg    MCHC 34.0 32.3 - 36.5 g/dL    RDW 42.5 35.9 - 50.0 fL    Platelet Count  187 164 - 446 K/uL    MPV 9.7 9.0 - 12.9 fL    Neutrophils-Polys 50.10 44.00 - 72.00 %    Lymphocytes 38.10 22.00 - 41.00 %    Monocytes 9.60 0.00 - 13.40 %    Eosinophils 1.40 0.00 - 6.90 %    Basophils 0.50 0.00 - 1.80 %    Immature Granulocytes 0.30 0.00 - 0.90 %    Nucleated RBC 0.00 0.00 - 0.20 /100 WBC    Neutrophils (Absolute) 2.86 1.82 - 7.42 K/uL    Lymphs (Absolute) 2.18 1.00 - 4.80 K/uL    Monos (Absolute) 0.55 0.00 - 0.85 K/uL    Eos (Absolute) 0.08 0.00 - 0.51 K/uL    Baso (Absolute) 0.03 0.00 - 0.12 K/uL    Immature Granulocytes (abs) 0.02 0.00 - 0.11 K/uL    NRBC (Absolute) 0.00 K/uL   Complete Metabolic Panel (CMP)    Collection Time: 09/28/24 11:12 AM   Result Value Ref Range    Sodium 138 135 - 145 mmol/L    Potassium 4.2 3.6 - 5.5 mmol/L    Chloride 107 96 - 112 mmol/L    Co2 22 20 - 33 mmol/L    Anion Gap 9.0 7.0 - 16.0    Glucose 108 (H) 65 - 99 mg/dL    Bun 19 8 - 22 mg/dL    Creatinine 1.11 0.50 - 1.40 mg/dL    Calcium 9.2 8.5 - 10.5 mg/dL    Correct Calcium 8.9 8.5 - 10.5 mg/dL    AST(SGOT) 20 12 - 45 U/L    ALT(SGPT) 27 2 - 50 U/L    Alkaline Phosphatase 48 30 - 99 U/L    Total Bilirubin 0.5 0.1 - 1.5 mg/dL    Albumin 4.4 3.2 - 4.9 g/dL    Total Protein 6.9 6.0 - 8.2 g/dL    Globulin 2.5 1.9 - 3.5 g/dL    A-G Ratio 1.8 g/dL   proBrain Natriuretic Peptide, NT (BNP)    Collection Time: 09/28/24 11:12 AM   Result Value Ref Range    NT-proBNP 433 (H) 0 - 125 pg/mL   Troponins NOW    Collection Time: 09/28/24 11:12 AM   Result Value Ref Range    Troponin T <6 6 - 19 ng/L   ESTIMATED GFR    Collection Time: 09/28/24 11:12 AM   Result Value Ref Range    GFR (CKD-EPI) 80 >60 mL/min/1.73 m 2   TSH WITH REFLEX TO FT4    Collection Time: 09/28/24 11:12 AM   Result Value Ref Range    TSH 1.320 0.380 - 5.330 uIU/mL   HEMOGLOBIN A1C    Collection Time: 09/28/24 11:12 AM   Result Value Ref Range    Glycohemoglobin 5.5 4.0 - 5.6 %    Est Avg Glucose 111 mg/dL   Troponins in two (2) hours    Collection Time:  09/28/24 12:30 PM   Result Value Ref Range    Troponin T <6 6 - 19 ng/L       Imaging:  DX-CHEST-PORTABLE (1 VIEW)   Final Result         No acute cardiac or pulmonary abnormality is identified.      EC-ECHOCARDIOGRAM COMPLETE W/O CONT    (Results Pending)           EKG tracings personally reviewed by me shows atrial fibrillation with RVR    Laboratory: Atrial fibrillation with ventricular rate ranging between 90 to 120 bpm    All pertinent features of laboratory and imaging reviewed including primary images where applicable      Principal Problem:    Atrial fibrillation with RVR (HCC) (POA: Yes)  Active Problems:    Primary hypertension (POA: Unknown)    Obesity (POA: Unknown)  Resolved Problems:    * No resolved hospital problems. *      Assessment / Plan:  New onset atrial fibrillation with RVR  Hypertension  Obesity  DONTA  Elevated [id number] score of at least 1 (HTN)    Recommendations:  New onset atrial fibrillation with symptoms.  Given that it has been greater than 48 hours since symptom onset, he will require UNA guided cardioversion for restoration of sinus rhythm.  For now, he will be continued on Xarelto for OAC as well as metoprolol for rate control.  We will uptitrate metoprolol as needed to achieve optimal ventricular rates.  Increase metoprolol 50mg BID.  Risk/benefits of UNA guided cardioversion discussed with patient.  Patient agrees to proceed.  Will plan for UNA guided cardioversion early next week, likely on Monday if schedule allows.  N.p.o. after midnight Sunday night  Check echocardiogram  Cardiology will continue to follow.    The risks, benefits, and alternatives to transesophageal echocardiogram (UNA) with IV sedation were discussed with the patient in specific detail, including oropharyngeal and esophageal traumas including hoarseness and dysphagia after the procedure. Rare cases demonstrating serious or fatal complications associated with UNA have been reported in the adult population,  including cardiac, pulmonary and bleeding complications in less than 1% of people. Patients with an identified intracardiac thrombus are at increased risk for embolic events (absolute risk uncertain, range 0%-38%), and this appears to be reduced with anticoagulation therapy (absolute risk reduction uncertain). The patient verbalized understanding about these possible complications, and wishes to proceed with this procedure.      I personally discussed his case with  Dr Tab Ibanez M.D.    No future appointments.    It is my pleasure to participate in the care of Mr. Sotelo.  Please do not hesitate to contact me with questions or concerns.    Viraj Baldwin MD  Cardiologist, [facility]    9/28/2024    Please note that this dictation was created using voice recognition software. I have made every reasonable attempt to correct obvious errors, but it is possible there are errors of grammar and possibly content that I did not discover before finalizing the note.

## 2024-09-29 NOTE — PROGRESS NOTES
[facility] Medicine Daily Progress Note    Date of Service  9/29/2024    Chief Complaint  Km Sotelo is a 49 y.o. male admitted 9/28/2024 with   Chief Complaint   Patient presents with    Palpitations         Hospital Course  No notes on file    49-year-old male with history of hypertension, obesity and mild sleep apnea who presented 9/28 with palpitation. Patient has had palpitation for last couple days, getting worse, denies significant chest pain, no dizziness or lightheadedness and no syncope. Patient does not have history of A-fib or significant medical problems before. On admission patient was found to have A-fib with RVR. Patient received metoprolol with improving. Cardiologist was consulted for possible cardioversion, Xarelto and metoprolol 50 mg daily was started. Patient denied any history of alcoholism or drug abuse and no significant history of smoking.  Troponins were negative x 3.  TSH was normal.    Interval Problem Update  Patient was seen and examined at bedside.  I have personally reviewed and interpreted vitals, labs, and imaging.    9/29.  Afebrile.  Intermittent tachycardia.  Hypertensive, diastolic.  On room air.  Denies fevers, chills, chest pains.  Having intermittent palpitations and shortness of breath.  Discussed with cardiology.  Plan for UNA with cardioversion tomorrow.  Continue rivaroxaban and metoprolol.  Discussed with cardiology.  Plan for UNA tomorrow.  Hgb 13.9  HDL 30   A1c 5.5    I have discussed this patient's plan of care and discharge plan at IDT rounds today with Case Management, Nursing, Nursing leadership, and other members of the IDT team.    Consultants/Specialty  cardiology    Code Status  Full Code    Disposition  The patient is not medically cleared for discharge to home or a post-acute facility.  Anticipate discharge to: home with close outpatient follow-up    I have placed the appropriate orders for post-discharge needs.    Review of Systems  Review of  Systems   Respiratory:  Positive for shortness of breath.    Cardiovascular:  Positive for palpitations. Negative for chest pain.        Physical Exam  Temp:  [36.3 °C (97.4 °F)-37.1 °C (98.8 °F)] 36.7 °C (98.1 °F)  Pulse:  [] 60  Resp:  [14-22] 18  BP: (120-172)/() 140/95  SpO2:  [92 %-98 %] 96 %    Physical Exam  Vitals and nursing note reviewed.   Constitutional:       Appearance: Normal appearance. He is obese. He is ill-appearing.   HENT:      Head: Normocephalic and atraumatic.      Nose: Nose normal.      Mouth/Throat:      Mouth: Mucous membranes are moist.      Pharynx: Oropharynx is clear.   Eyes:      Extraocular Movements: Extraocular movements intact.      Conjunctiva/sclera: Conjunctivae normal.   Cardiovascular:      Rate and Rhythm: Tachycardia present. Rhythm irregular.      Pulses: Normal pulses.      Heart sounds: Normal heart sounds. No murmur heard.     No friction rub. No gallop.   Pulmonary:      Effort: Pulmonary effort is normal. No respiratory distress.      Breath sounds: Normal breath sounds. No wheezing or rales.   Chest:      Chest wall: No tenderness.   Abdominal:      General: Abdomen is flat. Bowel sounds are normal. There is no distension.      Palpations: Abdomen is soft. There is no mass.      Tenderness: There is no abdominal tenderness. There is no guarding.   Musculoskeletal:         General: Normal range of motion.      Cervical back: Normal range of motion and neck supple.   Skin:     General: Skin is warm.      Capillary Refill: Capillary refill takes less than 2 seconds.   Neurological:      General: No focal deficit present.      Mental Status: He is alert and oriented to person, place, and time. Mental status is at baseline.      Cranial Nerves: No cranial nerve deficit.      Motor: No weakness.   Psychiatric:         Mood and Affect: Mood normal.         Behavior: Behavior normal.         Fluids  No intake or output data in the 24 hours ending 09/29/24 4621      Laboratory  Recent Labs     09/28/24  1112 09/29/24  0416   WBC 5.7 7.2   RBC 4.72 4.23*   HEMOGLOBIN 15.5 13.9*   HEMATOCRIT 45.6 40.9*   MCV 96.6 96.7   MCH 32.8 32.9   MCHC 34.0 34.0   RDW 42.5 43.3   PLATELETCT 187 166   MPV 9.7 10.0     Recent Labs     09/28/24  1112 09/29/24  0416   SODIUM 138 140   POTASSIUM 4.2 4.1   CHLORIDE 107 105   CO2 22 20   GLUCOSE 108* 103*   BUN 19 19   CREATININE 1.11 1.05   CALCIUM 9.2 8.8             Recent Labs     09/29/24  0416   TRIGLYCERIDE 59   HDL 30*   *       Imaging  DX-CHEST-PORTABLE (1 VIEW)   Final Result         No acute cardiac or pulmonary abnormality is identified.      EC-ECHOCARDIOGRAM COMPLETE W/O CONT    (Results Pending)   CL-CARDIOVERSION    (Results Pending)   EC-UNA W/O CONT    (Results Pending)        Assessment/Plan  * Atrial fibrillation with RVR (HCC)- (present on admission)  Assessment & Plan  9/29/2024  New diagnosis, came with palpitation  Start with metoprolol XL 50 mg daily  Xarelto 20 mg daily, waiting for echo and check CHADS2 score  Check TSH  No history of drinking alcohol, patient might need repeat sleep study  Cardioversion and UNA during hospitalization versus in clinic in 3 weeks, follow-up with cardiology team.    Obesity  Assessment & Plan  Body mass index is 39.59 kg/m².  Counseled about diet and exercise  Check lipid panel and A1c  Encouraged the patient to follow-up with PCP and repeat sleep study    Primary hypertension  Assessment & Plan  9/29/2024  Will hold his medication lisinopril and amlodipine and start with metoprolol for A-fib  Close monitoring and adjust medication if needed         VTE prophylaxis: Rivaroxaban    I have performed a physical exam and reviewed and updated ROS and Plan today (9/29/2024). In review of yesterday's note (9/28/2024), there are no changes except as documented above.    Greater than 50 minutes spent prepping to see patient (e.g. review of tests) obtaining and/or reviewing separately  obtained history. Performing a medically appropriate examination and/ evaluation.  Counseling and educating the patient/family/caregiver.  Ordering medications, tests, or procedures.  Referring and communicating with other health care professionals.  Documenting clinical information in EPIC.  Independently interpreting results and communicating results to patient/family/caregiver.  Care coordination.

## 2024-09-29 NOTE — CARE PLAN
Problem: Cardiac - Atrial Fibrillation  Goal: Patient will achieve & maintain adequate cardiac output and rate control  Description: Target End Date:  1 to 3 days    Document on Assessment flowsheet    1.  Assess cardiovascular status and symptoms (palpitations, dizziness, tachycardia, confusion, weakness, shortness of breath)  2.  Obtain order for STAT EKG if not done  3.  Assess and monitor signs and symptoms of heart failure  4.   Implement preoperative care and/or procedures  Outcome: Progressing     Problem: Knowledge Deficit - Atrial Fibrillation  Goal: Patient and family/care givers will demonstrate understanding of atrial fibrillation condition and follow-up  Description: Target End Date:  1-3 days or as soon as patient condition allows    1.  Instruct immediate reporting of any chest discomfort or pain  2.  Assess for appropriateness of cardiac rehab referral  Outcome: Progressing   The patient is Stable - Low risk of patient condition declining or worsening    Shift Goals: VSS, safety  Clinical Goals: monitor HR, cardio, VSS  Patient Goals: to go home    Progress made toward(s) clinical / shift goals:  VSS, safety    Patient is not progressing towards the following goals:

## 2024-09-29 NOTE — CARE PLAN
The patient is Stable - Low risk of patient condition declining or worsening    Shift Goals  Clinical Goals: Monitor and control Afib, VSS  Patient Goals: get updated on poc  Family Goals: updates    Progress made toward(s) clinical / shift goals:      Problem: Knowledge Deficit - Standard  Goal: Patient and family/care givers will demonstrate understanding of plan of care, disease process/condition, diagnostic tests and medications  Outcome: Progressing     Problem: Cardiac - Atrial Fibrillation  Goal: Patient will achieve & maintain adequate cardiac output and rate control  Outcome: Progressing  Goal: Complications related to anticoagulation for unconverted atrial fibrillation will be avoided or minimized  Outcome: Progressing     Problem: Mobility  Goal: Patient's ability to tolerate increased activity will improve  Outcome: Progressing     Problem: Self Care  Goal: Patient will have the ability to perform ADLs independently or with assistance (bathe, groom, dress, toilet and feed)  Outcome: Progressing     Problem: Knowledge Deficit - Atrial Fibrillation  Goal: Patient and family/care givers will demonstrate understanding of atrial fibrillation condition and follow-up  Outcome: Progressing

## 2024-09-29 NOTE — PROGRESS NOTES
Cardiology Follow Up Progress Note    Date of Service  9/29/2024    Attending Physician  Po Fernandez D.O.    Chief Complaint     New findings of A-fib RVR    HPI  Km Sotelo is a 49 y.o. male admitted 9/28/2024 with palpitations for the past 3 to 4 days.  Found A-fib RVR.    PMH: Hypertension, BMI 40, sleep apnea      Interim Events    [id number] heart rate well-controlled  SBP 140s  Plan for UNA guided cardioversion tentatively 9/30/2024  Keep n.p.o. at midnight  Continue rivaroxaban  Echo pending    Review of Systems  Review of Systems   Respiratory:  Negative for cough, choking, chest tightness, shortness of breath and stridor.        Vital signs in last 24 hours  Temp:  [36.7 °C (98.1 °F)-37.1 °C (98.8 °F)] 36.7 °C (98.1 °F)  Pulse:  [] 82  Resp:  [14-22] 16  BP: (120-160)/() 145/103  SpO2:  [92 %-97 %] 95 %    Physical Exam  Physical Exam  Cardiovascular:      Rate and Rhythm: Normal rate. Rhythm irregular.      Pulses: Normal pulses.   Pulmonary:      Effort: Pulmonary effort is normal.   Skin:     General: Skin is warm.   Neurological:      Mental Status: He is alert. Mental status is at baseline.   Psychiatric:         Mood and Affect: Mood normal.         Lab Review  Lab Results   Component Value Date/Time    WBC 7.2 09/29/2024 04:16 AM    RBC 4.23 (L) 09/29/2024 04:16 AM    HEMOGLOBIN 13.9 (L) 09/29/2024 04:16 AM    HEMATOCRIT 40.9 (L) 09/29/2024 04:16 AM    MCV 96.7 09/29/2024 04:16 AM    MCH 32.9 09/29/2024 04:16 AM    MCHC 34.0 09/29/2024 04:16 AM    MPV 10.0 09/29/2024 04:16 AM      Lab Results   Component Value Date/Time    SODIUM 140 09/29/2024 04:16 AM    POTASSIUM 4.1 09/29/2024 04:16 AM    CHLORIDE 105 09/29/2024 04:16 AM    CO2 20 09/29/2024 04:16 AM    GLUCOSE 103 (H) 09/29/2024 04:16 AM    BUN 19 09/29/2024 04:16 AM    CREATININE 1.05 09/29/2024 04:16 AM      Lab Results   Component Value Date/Time    ASTSGOT 20 09/28/2024 11:12 AM    ALTSGPT 27 09/28/2024 11:12 AM      Lab Results   Component Value Date/Time    CHOLSTRLTOT 161 09/29/2024 04:16 AM     (H) 09/29/2024 04:16 AM    HDL 30 (A) 09/29/2024 04:16 AM    TRIGLYCERIDE 59 09/29/2024 04:16 AM    TROPONINT <6 09/28/2024 12:30 PM       Recent Labs     09/28/24  1112   NTPROBNP 433*       Cardiac Imaging and Procedures Review  EKG: Atrial fibrillation      Echocardiogram:  pending             Imaging  Chest X-Ray:    No acute cardiopulmonary abnormalities.          Assessment/Plan    # New onset A-fib RVR  # Hypertension  # Obesity  # DONTA  # [id number] score, hypertension      -Keep n.p.o. at midnight.  -Tentatively plan for UNA cardioversion 9/20/2024.  -Continue rivaroxaban 20 mg daily.  -Metoprolol 50 twice daily.  -Follow-up on echocardiogram.      I personally spent a total of 15 minutes which includes face-to-face time and non-face-to-face time spent on preparing to see the patient, reviewing hospital notes and tests, obtaining history from the patient, performing a medically appropriate exam, counseling and educating the patient, ordering medications/tests/procedures/referrals as clinically indicated, and documenting information in the electronic medical record.         Thank you for allowing me to participate in the care of this patient.  I will continue to follow this patient    Please contact me with any questions.    JESSE Lake.   Cardiologist, [facility]  [phone]

## 2024-09-29 NOTE — PROGRESS NOTES
Bedside report received from off going RN/tech: Dorina, assumed care of patient.     Fall Risk Score: NO RISK  Fall risk interventions in place: Provide patient/family education based on risk assessment  Bed type: Regular (Ron Score less than 17 interventions in place)  Patient on cardiac monitor: Yes  IVF/IV medications: Not Applicable   Oxygen: Room Air  Bedside sitter: Not Applicable   Isolation: Not applicable    Pt sleeping comfortably in bed. Call light and belongings are within reach and bed is in lowest locked position.

## 2024-09-30 ENCOUNTER — ANESTHESIA (OUTPATIENT)
Dept: CARDIOLOGY | Facility: MEDICAL CENTER | Age: 49
End: 2024-09-30
Payer: COMMERCIAL

## 2024-09-30 ENCOUNTER — ANESTHESIA EVENT (OUTPATIENT)
Dept: CARDIOLOGY | Facility: MEDICAL CENTER | Age: 49
End: 2024-09-30
Payer: COMMERCIAL

## 2024-09-30 ENCOUNTER — PHARMACY VISIT (OUTPATIENT)
Dept: PHARMACY | Facility: MEDICAL CENTER | Age: 49
End: 2024-09-30
Payer: COMMERCIAL

## 2024-09-30 ENCOUNTER — APPOINTMENT (OUTPATIENT)
Dept: CARDIOLOGY | Facility: MEDICAL CENTER | Age: 49
End: 2024-09-30
Attending: NURSE PRACTITIONER
Payer: COMMERCIAL

## 2024-09-30 VITALS
DIASTOLIC BLOOD PRESSURE: 81 MMHG | WEIGHT: 299.61 LBS | RESPIRATION RATE: 18 BRPM | TEMPERATURE: 97.9 F | SYSTOLIC BLOOD PRESSURE: 143 MMHG | OXYGEN SATURATION: 93 % | HEIGHT: 73 IN | HEART RATE: 66 BPM | BODY MASS INDEX: 39.71 KG/M2

## 2024-09-30 PROBLEM — I48.0 PAROXYSMAL ATRIAL FIBRILLATION (HCC): Status: ACTIVE | Noted: 2024-09-30

## 2024-09-30 PROBLEM — G47.30 MILD SLEEP APNEA: Status: ACTIVE | Noted: 2024-09-30

## 2024-09-30 PROBLEM — R53.83 OTHER FATIGUE: Status: ACTIVE | Noted: 2024-09-30

## 2024-09-30 LAB
ALBUMIN SERPL BCP-MCNC: 3.8 G/DL (ref 3.2–4.9)
ALBUMIN/GLOB SERPL: 1.6 G/DL
ALP SERPL-CCNC: 48 U/L (ref 30–99)
ALT SERPL-CCNC: 22 U/L (ref 2–50)
ANION GAP SERPL CALC-SCNC: 14 MMOL/L (ref 7–16)
AST SERPL-CCNC: 19 U/L (ref 12–45)
BILIRUB SERPL-MCNC: 0.3 MG/DL (ref 0.1–1.5)
BUN SERPL-MCNC: 22 MG/DL (ref 8–22)
CALCIUM ALBUM COR SERPL-MCNC: 8.9 MG/DL (ref 8.5–10.5)
CALCIUM SERPL-MCNC: 8.7 MG/DL (ref 8.5–10.5)
CHLORIDE SERPL-SCNC: 107 MMOL/L (ref 96–112)
CO2 SERPL-SCNC: 18 MMOL/L (ref 20–33)
CREAT SERPL-MCNC: 1.13 MG/DL (ref 0.5–1.4)
EKG IMPRESSION: NORMAL
ERYTHROCYTE [DISTWIDTH] IN BLOOD BY AUTOMATED COUNT: 43.4 FL (ref 35.9–50)
GFR SERPLBLD CREATININE-BSD FMLA CKD-EPI: 79 ML/MIN/1.73 M 2
GLOBULIN SER CALC-MCNC: 2.4 G/DL (ref 1.9–3.5)
GLUCOSE SERPL-MCNC: 106 MG/DL (ref 65–99)
HCT VFR BLD AUTO: 42.5 % (ref 42–52)
HGB BLD-MCNC: 14.7 G/DL (ref 14–18)
MAGNESIUM SERPL-MCNC: 2 MG/DL (ref 1.5–2.5)
MCH RBC QN AUTO: 33.9 PG (ref 27–33)
MCHC RBC AUTO-ENTMCNC: 34.6 G/DL (ref 32.3–36.5)
MCV RBC AUTO: 98.2 FL (ref 81.4–97.8)
PHOSPHATE SERPL-MCNC: 3.9 MG/DL (ref 2.5–4.5)
PLATELET # BLD AUTO: 167 K/UL (ref 164–446)
PMV BLD AUTO: 10 FL (ref 9–12.9)
POTASSIUM SERPL-SCNC: 3.8 MMOL/L (ref 3.6–5.5)
PROT SERPL-MCNC: 6.2 G/DL (ref 6–8.2)
RBC # BLD AUTO: 4.33 M/UL (ref 4.7–6.1)
SODIUM SERPL-SCNC: 139 MMOL/L (ref 135–145)
WBC # BLD AUTO: 7.1 K/UL (ref 4.8–10.8)

## 2024-09-30 PROCEDURE — 36415 COLL VENOUS BLD VENIPUNCTURE: CPT

## 2024-09-30 PROCEDURE — 93005 ELECTROCARDIOGRAM TRACING: CPT | Performed by: INTERNAL MEDICINE

## 2024-09-30 PROCEDURE — 700102 HCHG RX REV CODE 250 W/ 637 OVERRIDE(OP): Performed by: INTERNAL MEDICINE

## 2024-09-30 PROCEDURE — 700111 HCHG RX REV CODE 636 W/ 250 OVERRIDE (IP): Performed by: STUDENT IN AN ORGANIZED HEALTH CARE EDUCATION/TRAINING PROGRAM

## 2024-09-30 PROCEDURE — 93010 ELECTROCARDIOGRAM REPORT: CPT | Mod: 59 | Performed by: INTERNAL MEDICINE

## 2024-09-30 PROCEDURE — 80053 COMPREHEN METABOLIC PANEL: CPT

## 2024-09-30 PROCEDURE — 93312 ECHO TRANSESOPHAGEAL: CPT | Mod: 26 | Performed by: INTERNAL MEDICINE

## 2024-09-30 PROCEDURE — 92960 CARDIOVERSION ELECTRIC EXT: CPT | Performed by: INTERNAL MEDICINE

## 2024-09-30 PROCEDURE — 99239 HOSP IP/OBS DSCHRG MGMT >30: CPT | Performed by: INTERNAL MEDICINE

## 2024-09-30 PROCEDURE — 4410588 CL-CARDIOVERSION

## 2024-09-30 PROCEDURE — 700105 HCHG RX REV CODE 258: Performed by: STUDENT IN AN ORGANIZED HEALTH CARE EDUCATION/TRAINING PROGRAM

## 2024-09-30 PROCEDURE — 84100 ASSAY OF PHOSPHORUS: CPT

## 2024-09-30 PROCEDURE — 83735 ASSAY OF MAGNESIUM: CPT

## 2024-09-30 PROCEDURE — A9270 NON-COVERED ITEM OR SERVICE: HCPCS | Performed by: INTERNAL MEDICINE

## 2024-09-30 PROCEDURE — 160035 HCHG PACU - 1ST 60 MINS PHASE I

## 2024-09-30 PROCEDURE — 99233 SBSQ HOSP IP/OBS HIGH 50: CPT | Mod: FS | Performed by: INTERNAL MEDICINE

## 2024-09-30 PROCEDURE — 85027 COMPLETE CBC AUTOMATED: CPT

## 2024-09-30 PROCEDURE — 160002 HCHG RECOVERY MINUTES (STAT)

## 2024-09-30 PROCEDURE — 93325 DOPPLER ECHO COLOR FLOW MAPG: CPT

## 2024-09-30 PROCEDURE — 5A2204Z RESTORATION OF CARDIAC RHYTHM, SINGLE: ICD-10-PCS | Performed by: INTERNAL MEDICINE

## 2024-09-30 PROCEDURE — RXMED WILLOW AMBULATORY MEDICATION CHARGE: Performed by: INTERNAL MEDICINE

## 2024-09-30 RX ORDER — OXYCODONE HCL 5 MG/5 ML
5 SOLUTION, ORAL ORAL
Status: DISCONTINUED | OUTPATIENT
Start: 2024-09-30 | End: 2024-09-30 | Stop reason: HOSPADM

## 2024-09-30 RX ORDER — HALOPERIDOL 5 MG/ML
1 INJECTION INTRAMUSCULAR
Status: DISCONTINUED | OUTPATIENT
Start: 2024-09-30 | End: 2024-09-30 | Stop reason: HOSPADM

## 2024-09-30 RX ORDER — OXYCODONE HCL 5 MG/5 ML
10 SOLUTION, ORAL ORAL
Status: DISCONTINUED | OUTPATIENT
Start: 2024-09-30 | End: 2024-09-30 | Stop reason: HOSPADM

## 2024-09-30 RX ORDER — ONDANSETRON 2 MG/ML
4 INJECTION INTRAMUSCULAR; INTRAVENOUS
Status: DISCONTINUED | OUTPATIENT
Start: 2024-09-30 | End: 2024-09-30 | Stop reason: HOSPADM

## 2024-09-30 RX ORDER — ALBUTEROL SULFATE 5 MG/ML
2.5 SOLUTION RESPIRATORY (INHALATION)
Status: DISCONTINUED | OUTPATIENT
Start: 2024-09-30 | End: 2024-09-30 | Stop reason: HOSPADM

## 2024-09-30 RX ORDER — HYDRALAZINE HYDROCHLORIDE 20 MG/ML
5 INJECTION INTRAMUSCULAR; INTRAVENOUS
Status: DISCONTINUED | OUTPATIENT
Start: 2024-09-30 | End: 2024-09-30 | Stop reason: HOSPADM

## 2024-09-30 RX ORDER — LANOLIN ALCOHOL/MO/W.PET/CERES
400 CREAM (GRAM) TOPICAL 2 TIMES DAILY
Status: COMPLETED | OUTPATIENT
Start: 2024-09-30 | End: 2024-09-30

## 2024-09-30 RX ORDER — SODIUM CHLORIDE, SODIUM LACTATE, POTASSIUM CHLORIDE, CALCIUM CHLORIDE 600; 310; 30; 20 MG/100ML; MG/100ML; MG/100ML; MG/100ML
INJECTION, SOLUTION INTRAVENOUS
Status: DISCONTINUED | OUTPATIENT
Start: 2024-09-30 | End: 2024-09-30 | Stop reason: SURG

## 2024-09-30 RX ORDER — METOPROLOL TARTRATE 50 MG
50 TABLET ORAL 2 TIMES DAILY
Qty: 60 TABLET | Refills: 0 | Status: SHIPPED | OUTPATIENT
Start: 2024-09-30

## 2024-09-30 RX ORDER — POTASSIUM CHLORIDE 1500 MG/1
20 TABLET, EXTENDED RELEASE ORAL ONCE
Status: COMPLETED | OUTPATIENT
Start: 2024-09-30 | End: 2024-09-30

## 2024-09-30 RX ORDER — DIPHENHYDRAMINE HYDROCHLORIDE 50 MG/ML
12.5 INJECTION INTRAMUSCULAR; INTRAVENOUS
Status: DISCONTINUED | OUTPATIENT
Start: 2024-09-30 | End: 2024-09-30 | Stop reason: HOSPADM

## 2024-09-30 RX ORDER — SODIUM CHLORIDE, SODIUM LACTATE, POTASSIUM CHLORIDE, CALCIUM CHLORIDE 600; 310; 30; 20 MG/100ML; MG/100ML; MG/100ML; MG/100ML
INJECTION, SOLUTION INTRAVENOUS CONTINUOUS
Status: DISCONTINUED | OUTPATIENT
Start: 2024-09-30 | End: 2024-09-30 | Stop reason: HOSPADM

## 2024-09-30 RX ORDER — EPHEDRINE SULFATE 50 MG/ML
5 INJECTION, SOLUTION INTRAVENOUS
Status: DISCONTINUED | OUTPATIENT
Start: 2024-09-30 | End: 2024-09-30 | Stop reason: HOSPADM

## 2024-09-30 RX ADMIN — Medication 400 MG: at 06:05

## 2024-09-30 RX ADMIN — LISINOPRIL 20 MG: 20 TABLET ORAL at 17:07

## 2024-09-30 RX ADMIN — RIVAROXABAN 20 MG: 20 TABLET, FILM COATED ORAL at 17:07

## 2024-09-30 RX ADMIN — POTASSIUM CHLORIDE 20 MEQ: 1500 TABLET, EXTENDED RELEASE ORAL at 06:05

## 2024-09-30 RX ADMIN — METOPROLOL TARTRATE 50 MG: 50 TABLET, FILM COATED ORAL at 05:35

## 2024-09-30 RX ADMIN — PROPOFOL 130 MG: 10 INJECTION, EMULSION INTRAVENOUS at 12:35

## 2024-09-30 RX ADMIN — Medication 400 MG: at 17:07

## 2024-09-30 RX ADMIN — PROPOFOL 140 MCG/KG/MIN: 10 INJECTION, EMULSION INTRAVENOUS at 12:37

## 2024-09-30 RX ADMIN — SODIUM CHLORIDE, POTASSIUM CHLORIDE, SODIUM LACTATE AND CALCIUM CHLORIDE: 600; 310; 30; 20 INJECTION, SOLUTION INTRAVENOUS at 12:32

## 2024-09-30 RX ADMIN — METOPROLOL TARTRATE 50 MG: 50 TABLET, FILM COATED ORAL at 17:07

## 2024-09-30 ASSESSMENT — PAIN DESCRIPTION - PAIN TYPE
TYPE: ACUTE PAIN
TYPE: ACUTE PAIN

## 2024-09-30 ASSESSMENT — ENCOUNTER SYMPTOMS
STRIDOR: 0
SHORTNESS OF BREATH: 0
CHEST TIGHTNESS: 0
COUGH: 0
CHOKING: 0

## 2024-09-30 ASSESSMENT — FIBROSIS 4 INDEX: FIB4 SCORE: 1.24

## 2024-09-30 ASSESSMENT — PAIN SCALES - GENERAL: PAIN_LEVEL: 0

## 2024-09-30 NOTE — PROGRESS NOTES
1256: Pt arrived from procedure room post UNA and cardioversion under anesthesia. Pt is arousable to voice. Cardiac rhythm appears to be SR with PVCs.     1325: EKG to bedside.     1333: Report to YARELY Gallegos.     1343: Pt back to room via gurney with RN. Pt on a zoll and 1L NC. Tank is full.

## 2024-09-30 NOTE — PROGRESS NOTES
Cardiology Follow Up Progress Note    Date of Service  9/30/2024    Attending Physician  Po Fernandez D.O.    Chief Complaint     New findings of A-fib RVR    HPI  Km Sotelo is a 49 y.o. male admitted 9/28/2024 with palpitations for the past 3 to 4 days.  Found A-fib RVR.    PMH: Hypertension, BMI 40, sleep apnea      Interim Events  9/29/2024 Oqtuzjyri-H-hry heart rate well-controlled  9/30/2024: No overnight cardiac events. Tele monitoring personally interpreted by me shows Afib/FL. VSS; RA; Daily weight 136 kg. Labs reviewed, notable for .   Disposition: NPO for UNA/DCCV today. Potential discharge once recovered from moderate sedation    Review of Systems  Review of Systems   Respiratory:  Negative for cough, choking, chest tightness, shortness of breath and stridor.        Vital signs in last 24 hours  Temp:  [36.5 °C (97.7 °F)-36.9 °C (98.4 °F)] 36.8 °C (98.2 °F)  Pulse:  [] 79  Resp:  [16-18] 18  BP: (130-158)/() 130/86  SpO2:  [95 %-98 %] 98 %    Physical Exam  Physical Exam  Cardiovascular:      Rate and Rhythm: Normal rate. Rhythm irregular.      Pulses: Normal pulses.   Pulmonary:      Effort: Pulmonary effort is normal.   Skin:     General: Skin is warm.   Neurological:      Mental Status: He is alert. Mental status is at baseline.   Psychiatric:         Mood and Affect: Mood normal.         Lab Review  Lab Results   Component Value Date/Time    WBC 7.1 09/30/2024 01:41 AM    RBC 4.33 (L) 09/30/2024 01:41 AM    HEMOGLOBIN 14.7 09/30/2024 01:41 AM    HEMATOCRIT 42.5 09/30/2024 01:41 AM    MCV 98.2 (H) 09/30/2024 01:41 AM    MCH 33.9 (H) 09/30/2024 01:41 AM    MCHC 34.6 09/30/2024 01:41 AM    MPV 10.0 09/30/2024 01:41 AM      Lab Results   Component Value Date/Time    SODIUM 139 09/30/2024 01:41 AM    POTASSIUM 3.8 09/30/2024 01:41 AM    CHLORIDE 107 09/30/2024 01:41 AM    CO2 18 (L) 09/30/2024 01:41 AM    GLUCOSE 106 (H) 09/30/2024 01:41 AM    BUN 22 09/30/2024 01:41 AM     CREATININE 1.13 09/30/2024 01:41 AM      Lab Results   Component Value Date/Time    ASTSGOT 19 09/30/2024 01:41 AM    ALTSGPT 22 09/30/2024 01:41 AM     Lab Results   Component Value Date/Time    CHOLSTRLTOT 161 09/29/2024 04:16 AM     (H) 09/29/2024 04:16 AM    HDL 30 (A) 09/29/2024 04:16 AM    TRIGLYCERIDE 59 09/29/2024 04:16 AM    TROPONINT <6 09/28/2024 12:30 PM       Recent Labs     09/28/24  1112   NTPROBNP 433*       Cardiac Imaging and Procedures Review  EKG: Atrial fibrillation      Echocardiogram:  pending      Imaging  Chest X-Ray:    No acute cardiopulmonary abnormalities.    Assessment/Plan    # New onset A-fib RVR  # Hypertension  # Obesity  # DONTA  # [id number] score, hypertension    -Keep n.p.o.   -Tentatively plan for UNA cardioversion 9/20/2024.  -Continue lisinopril 20 mg daily  -Continue rivaroxaban 20 mg daily.  -Metoprolol 50 twice daily.  -echocardiogram pending  -Close follow up with local outpatient cardiology    Thank you for allowing me to participate in the care of this patient.    Please contact me with any questions.    Please see Dr. Kenyon's attestation for further details and MDM.     I personally spent a total of 15 minutes which includes face-to-face time and non-face-to-face time spent on preparing to see the patient, reviewing hospital notes and tests, obtaining history from the patient, performing a medically appropriate exam, counseling and educating the patient, ordering medications/tests/procedures/referrals as clinically indicated, and documenting information in the electronic medical record.    Khushboo Scott A.P.R.N, HF-CERT   [facility]  [phone]

## 2024-09-30 NOTE — CARE PLAN
The patient is Stable - Low risk of patient condition declining or worsening    Shift Goals  Clinical Goals: Monitor and control Afib, UNA and Cardioversion  Patient Goals: get procedure, updates  Family Goals: updates    Progress made toward(s) clinical / shift goals:        Problem: Knowledge Deficit - Standard  Goal: Patient and family/care givers will demonstrate understanding of plan of care, disease process/condition, diagnostic tests and medications  Outcome: Progressing     Problem: Cardiac - Atrial Fibrillation  Goal: Patient will achieve & maintain adequate cardiac output and rate control  Outcome: Progressing  Goal: Complications related to anticoagulation for unconverted atrial fibrillation will be avoided or minimized  Outcome: Progressing     Problem: Mobility  Goal: Patient's ability to tolerate increased activity will improve  Outcome: Progressing    Problem: Knowledge Deficit - Atrial Fibrillation  Goal: Patient and family/care givers will demonstrate understanding of atrial fibrillation condition and follow-up  Outcome: Progressing

## 2024-09-30 NOTE — ANESTHESIA TIME REPORT
Anesthesia Start and Stop Event Times       Date Time Event    9/30/2024 1215 Ready for Procedure     1220 Anesthesia Start     1258 Anesthesia Stop          Responsible Staff  09/30/24      Name Role Begin End    Marquita Odonnell M.D. Anesth 1220 1258          Overtime Reason:  no overtime (within assigned shift)    Comments:

## 2024-09-30 NOTE — PROGRESS NOTES
NO HARD CHART:   Patient to procedure Room for UNA/CV with no hard chart, only loose papers. Per Floor RN, no hard chart available. Procedure RN completed Pre-Procedure Consent paperwork packet, including: WHO Yellow Sheet signed by RN and MD, Informed consent for UNA/CV procedure signed by RN, patient, and Cardiology, and Informed consent for Anesthesia signed by Anesthesia MD and patient. Procedure Packet secured to loose papers and hand delivered to receiving PACU RN who acknowledged receipt and no hard chart.

## 2024-09-30 NOTE — DISCHARGE SUMMARY
Discharge Summary    CHIEF COMPLAINT ON ADMISSION  Chief Complaint   Patient presents with    Palpitations       Reason for Admission  sent by      Admission Date  9/28/2024    CODE STATUS  Full Code    HPI & HOSPITAL COURSE  49-year-old male with history of hypertension, obesity and mild sleep apnea who presented 9/28 with palpitation. Patient has had palpitation for last couple days, getting worse, denies significant chest pain, no dizziness or lightheadedness and no syncope. Patient does not have history of A-fib or significant medical problems before. On admission patient was found to have A-fib with RVR. Patient received metoprolol with improving. Cardiologist was consulted for possible cardioversion, Xarelto and metoprolol 50 mg daily was started. Patient denied any history of alcoholism or drug abuse and no significant history of smoking.  Troponins were negative x 3.  TSH was normal.     Status post UNA on 9/30 which showed no evidence of left atrial or left atrial appendage thrombus.  Status post external DC cardioversion with reversion to sinus rhythm.     Outpatient lisinopril was continued.  Patient will be discharged with Xarelto and metoprolol.     Medically stable to discharge home.  Follow-up with primary care, cardiology as outpatient.    Therefore, he is discharged in fair and stable condition to home with close outpatient follow-up.    The patient met 2-midnight criteria for an inpatient stay at the time of discharge.    Discharge Date  9/30/2024      FOLLOW UP ITEMS POST DISCHARGE  None    DISCHARGE DIAGNOSES  Principal Problem:    Atrial fibrillation with RVR (HCC) (POA: Yes)  Active Problems:    Primary hypertension (POA: Yes)    Obesity (POA: Yes)  Resolved Problems:    * No resolved hospital problems. *      FOLLOW UP  Future Appointments   Date Time Provider Department Center   10/21/2024 10:30 AM AMANDA Soto None         MEDICATIONS ON DISCHARGE     Medication List  Pt returned voice message and writer confirmed they are still on med mgmt wait list and as available apt open up intake will call for scheduling         START taking these medications        Instructions   metoprolol tartrate 50 MG Tabs  Commonly known as: Lopressor   Take 1 Tablet by mouth 2 times a day.  Dose: 50 mg     rivaroxaban 20 MG Tabs tablet  Commonly known as: Xarelto   Take 1 Tablet by mouth with dinner.  Dose: 20 mg            CONTINUE taking these medications        Instructions   lisinopril 20 MG Tabs  Commonly known as: Prinivil   Take 20 mg by mouth every day.  Dose: 20 mg     sildenafil 20 MG tablet  Commonly known as: Revatio   Take 20 mg by mouth 1 time a day as needed (erectile dysfunction).  Dose: 20 mg            STOP taking these medications      amLODIPine 5 MG Tabs  Commonly known as: Norvasc              Allergies  No Known Allergies    DIET  Orders Placed This Encounter   Procedures    Diet Order Diet: Cardiac     Standing Status:   Standing     Number of Occurrences:   1     Order Specific Question:   Diet:     Answer:   Cardiac [6]       ACTIVITY  As tolerated.  Weight bearing as tolerated    CONSULTATIONS  Cards    PROCEDURES  UNA cardioversion    LABORATORY  Lab Results   Component Value Date    SODIUM 139 09/30/2024    POTASSIUM 3.8 09/30/2024    CHLORIDE 107 09/30/2024    CO2 18 (L) 09/30/2024    GLUCOSE 106 (H) 09/30/2024    BUN 22 09/30/2024    CREATININE 1.13 09/30/2024        Lab Results   Component Value Date    WBC 7.1 09/30/2024    HEMOGLOBIN 14.7 09/30/2024    HEMATOCRIT 42.5 09/30/2024    PLATELETCT 167 09/30/2024        I discussed medications and side effects with the patient.  I discussed prognosis and importance of medical compliance with the patient.  I counseled the patient about diet, exercise, weight loss, smoking cessation, and life style modifications.  All questions and concerns have been addressed.  Total time of the discharge process was 37 minutes.

## 2024-09-30 NOTE — DISCHARGE INSTRUCTIONS
Discharge Instructions per Dr. Po Fernandez D.O.    DIET: Diet Order Diet: Cardiac    ACTIVITY: As tolerated    A proper diet that is low in grease, fat, and salt, along with 30 minutes of exercise per day will lead to weight loss, and better controlled blood sugar and blood pressure.    DIAGNOSIS: Atrial fibrillation with RVR (HCC)    Follow up with your Primary Care Provider Pcp as scheduled or sooner if your symptoms persist or worsen.  Return to Emergency Room for sever chest pain, shortness of breath, signs of a stroke, or any other emergencies.

## 2024-09-30 NOTE — PROGRESS NOTES
Bedside report received from off going RN/tech: Chang, assumed care of patient.     Fall Risk Score: NO RISK  Fall risk interventions in place: Provide patient/family education based on risk assessment  Bed type: Regular (Ron Score less than 17 interventions in place)  Patient on cardiac monitor: Yes  IVF/IV medications: Not Applicable   Oxygen: Room Air  Bedside sitter: Not Applicable   Isolation: Not applicable

## 2024-09-30 NOTE — ANESTHESIA POSTPROCEDURE EVALUATION
Patient: Km Sotelo    Procedure Summary       Date: 09/30/24 Room / Location: [facility]    Anesthesia Start: 1220 Anesthesia Stop: 1258    Procedures:       CL-CARDIOVERSION      EC-UNA W/O CONT Diagnosis:             Atrial fibrillation with RVR (HCC)            Atrial fibrillation with RVR (HCC)            Atrial fibrillation with RVR (HCC)      (Atrial Fibrillation)      (Atrial Fibrillation)    Scheduled Providers: Marquita Odonnell M.D.; Kd Hammonds M.D. Responsible Provider: Marquita Odonnell M.D.    Anesthesia Type: MAC ASA Status: 3            Final Anesthesia Type: MAC  Last vitals  BP   Blood Pressure: 128/88    Temp   36.4 °C (97.5 °F)    Pulse   77   Resp   18    SpO2   98 %      Anesthesia Post Evaluation    Patient location during evaluation: PACU  Patient participation: complete - patient participated  Level of consciousness: awake and alert  Pain score: 0    Airway patency: patent  Anesthetic complications: no  Cardiovascular status: hemodynamically stable  Respiratory status: acceptable and face mask  Hydration status: euvolemic    PONV: none          No notable events documented.     Nurse Pain Score: 0 (NPRS)

## 2024-09-30 NOTE — CARE PLAN
The patient is Watcher - Medium risk of patient condition declining or worsening    Shift Goals  Clinical Goals: monitor HR and rhythm, NPO at 0000  Patient Goals: updates, comfort  Family Goals: updates    Progress made toward(s) clinical / shift goals:    Problem: Knowledge Deficit - Standard  Goal: Patient and family/care givers will demonstrate understanding of plan of care, disease process/condition, diagnostic tests and medications  Outcome: Progressing  Note: Discuss and review POC with patient/family. Re-educate as needed.       Problem: Cardiac - Atrial Fibrillation  Goal: Patient will achieve & maintain adequate cardiac output and rate control  Outcome: Progressing  Goal: Complications related to anticoagulation for unconverted atrial fibrillation will be avoided or minimized  Outcome: Progressing     Problem: Knowledge Deficit - Atrial Fibrillation  Goal: Patient and family/care givers will demonstrate understanding of atrial fibrillation condition and follow-up  Outcome: Progressing

## 2024-09-30 NOTE — ANESTHESIA PREPROCEDURE EVALUATION
Anesthesia Start Date/Time: 09/30/24 1220    Scheduled providers: Mraquita Odonnell M.D.; Kd Hammonds M.D.    Procedures:       CL-CARDIOVERSION      EC-UNA W/O CONT    Diagnosis:             Atrial fibrillation with RVR (HCC) [I48.91]            Atrial fibrillation with RVR (HCC) [I48.91]            Atrial fibrillation with RVR (HCC) [I48.91]    Indications:       Atrial Fibrillation      Atrial Fibrillation    Location: [facility] - [facility]ocard[facility]            Relevant Problems   CARDIAC   (positive) Atrial fibrillation with RVR (HCC)   (positive) Primary hypertension       Physical Exam    Airway   Mallampati: II  TM distance: >3 FB  Neck ROM: full       Cardiovascular - normal exam  Rhythm: regular  Rate: normal  (-) murmur     Dental - normal exam           Pulmonary - normal exam  Breath sounds clear to auscultation     Abdominal    Neurological - normal exam                   Anesthesia Plan    ASA 3   ASA physical status 3 criteria: morbid obesity - BMI greater than or equal to 40    Plan - MAC               Induction: intravenous    Postoperative Plan: Postoperative administration of opioids is intended.    Pertinent diagnostic labs and testing reviewed    Informed Consent:    Anesthetic plan and risks discussed with patient.    Use of blood products discussed with: patient whom consented to blood products.

## 2024-09-30 NOTE — PROCEDURES
Preprocedural Diagnosis: True fibrillation  Postprocedural Diagnosis: Sinus rhythm     Procedure performed: External DC Cardioversion and transesophageal echocardiogram     : Kd Hammonds MD     Assistant: None     Anesthesia: IV propofol per Marquita Odonnell MD, anesthesiologist     Description of procedure:  Informed consent had been obtained in which the indications, risks and benefits of the procedure had been discussed with the patient who expressed understanding and wished to proceed.  The patient was properly prepared for the procedure per protocol.  Defibrillator pads were placed in the anterior and posterior position.   A preprocedure timeout was performed. Adequate sedation provided by Dr. Odonnell.  A transesophageal probe was passed into the mid esophagus with findings showing no evidence of left atrial or left atrial appendage thrombus.  The transesophageal probe was removed.  The patient received a single synchronized shock of 200 J with reversion to sinus rhythm.  The patient had no immediate post-procedure complications.     Conclusion: Successful DC cardioversion

## 2024-09-30 NOTE — PROGRESS NOTES
Bedside report received from off going RN/tech: Rosy and Angelita, assumed care of patient.     Fall Risk Score: NO RISK  Fall risk interventions in place: Provide patient/family education based on risk assessment  Bed type: Regular (Ron Score less than 17 interventions in place)  Patient on cardiac monitor: Yes  IVF/IV medications: Not Applicable   Oxygen: Room Air  Bedside sitter: Not Applicable   Isolation: Not applicable

## 2024-09-30 NOTE — PROGRESS NOTES
Pt arrived to T824 from PACU, A&Ox4 on 1L O2 via NC, denies any pain. Pt put back on tele monitor and monitor room called.

## 2024-09-30 NOTE — PROGRESS NOTES
Pt transferred on gurney down to UNA. Monitor removed and monitor room called. Pt transported with asim and ACLS YARELY Vilchis.

## 2024-10-01 NOTE — PROGRESS NOTES
Pt provided discharge education and all questions answered. Tele monitor removed and monitor room called. PIV removed. All belongings with pt on d/c. Pt walked to car with RN.

## 2024-10-21 ENCOUNTER — APPOINTMENT (OUTPATIENT)
Dept: CARDIOLOGY | Facility: MEDICAL CENTER | Age: 49
End: 2024-10-21
Payer: COMMERCIAL

## 2024-10-21 ENCOUNTER — TELEPHONE (OUTPATIENT)
Dept: CARDIOLOGY | Facility: MEDICAL CENTER | Age: 49
End: 2024-10-21
Payer: COMMERCIAL